# Patient Record
Sex: FEMALE | Race: WHITE | NOT HISPANIC OR LATINO | Employment: FULL TIME
[De-identification: names, ages, dates, MRNs, and addresses within clinical notes are randomized per-mention and may not be internally consistent; named-entity substitution may affect disease eponyms.]

---

## 2017-01-17 ENCOUNTER — RECORDS - HEALTHEAST (OUTPATIENT)
Dept: ADMINISTRATIVE | Facility: OTHER | Age: 42
End: 2017-01-17

## 2017-01-24 ENCOUNTER — COMMUNICATION - HEALTHEAST (OUTPATIENT)
Dept: TELEHEALTH | Facility: CLINIC | Age: 42
End: 2017-01-24

## 2017-01-24 ENCOUNTER — HOSPITAL ENCOUNTER (OUTPATIENT)
Dept: MAMMOGRAPHY | Facility: CLINIC | Age: 42
Discharge: HOME | End: 2017-01-24
Attending: FAMILY MEDICINE

## 2017-01-24 DIAGNOSIS — N64.89 BREAST ASYMMETRY IN FEMALE: ICD-10-CM

## 2019-04-29 ENCOUNTER — HOSPITAL ENCOUNTER (OUTPATIENT)
Dept: MAMMOGRAPHY | Facility: CLINIC | Age: 44
Discharge: HOME | End: 2019-04-29
Attending: OBSTETRICS & GYNECOLOGY

## 2019-04-29 DIAGNOSIS — Z12.31 VISIT FOR SCREENING MAMMOGRAM: ICD-10-CM

## 2020-02-24 RX ORDER — CETIRIZINE HYDROCHLORIDE 10 MG/1
10 TABLET ORAL DAILY
Status: SHIPPED | COMMUNITY
Start: 2020-02-24 | End: 2023-11-30

## 2020-02-24 ASSESSMENT — MIFFLIN-ST. JEOR
SCORE: 1843.37
SCORE: 1848.37

## 2020-02-26 ENCOUNTER — ANESTHESIA - HEALTHEAST (OUTPATIENT)
Dept: SURGERY | Facility: AMBULATORY SURGERY CENTER | Age: 45
End: 2020-02-26

## 2020-02-27 ENCOUNTER — SURGERY - HEALTHEAST (OUTPATIENT)
Dept: SURGERY | Facility: AMBULATORY SURGERY CENTER | Age: 45
End: 2020-02-27

## 2020-02-27 ENCOUNTER — HOSPITAL ENCOUNTER (OUTPATIENT)
Dept: SURGERY | Facility: AMBULATORY SURGERY CENTER | Age: 45
Discharge: HOME | End: 2020-02-27
Attending: OBSTETRICS & GYNECOLOGY | Admitting: OBSTETRICS & GYNECOLOGY

## 2020-02-27 DIAGNOSIS — Z30.2 ENCOUNTER FOR STERILIZATION: ICD-10-CM

## 2020-02-27 DIAGNOSIS — N83.201 OVARIAN CYST, RIGHT: ICD-10-CM

## 2020-02-27 DIAGNOSIS — N92.0 MENORRHAGIA: ICD-10-CM

## 2020-02-27 LAB
DIPSTICK EXPIRATION DATE - HISTORICAL: NORMAL
DIPSTICK LOT NUMBER - HISTORICAL: NORMAL
POC PREG URINE (HCG) HE - HISTORICAL: NEGATIVE
POC SPECIFIC GRAVITY, URINE - HISTORICAL: NORMAL
POCT KIT EXPIRATION DATE - HISTORICAL: NORMAL
POCT KIT LOT NUMBER HE - HISTORICAL: NORMAL
POCT NEGATIVE CONTROL HE - HISTORICAL: NORMAL
POCT POSITIVE CONTROL HE - HISTORICAL: NORMAL

## 2020-02-27 RX ORDER — ACETAMINOPHEN 500 MG
500 TABLET ORAL EVERY 6 HOURS PRN
Refills: 0 | Status: SHIPPED | COMMUNITY
Start: 2020-02-27 | End: 2023-11-30

## 2020-02-27 ASSESSMENT — MIFFLIN-ST. JEOR
SCORE: 1871.05
SCORE: 1866.05

## 2020-06-16 ENCOUNTER — RECORDS - HEALTHEAST (OUTPATIENT)
Dept: LAB | Facility: CLINIC | Age: 45
End: 2020-06-16

## 2020-06-17 LAB — COVID-19 ANTIBODY IGG: NEGATIVE

## 2020-11-24 ENCOUNTER — RECORDS - HEALTHEAST (OUTPATIENT)
Dept: ADMINISTRATIVE | Facility: OTHER | Age: 45
End: 2020-11-24

## 2020-11-24 ENCOUNTER — AMBULATORY - HEALTHEAST (OUTPATIENT)
Dept: SURGERY | Facility: HOSPITAL | Age: 45
End: 2020-11-24

## 2020-11-24 DIAGNOSIS — Z11.59 ENCOUNTER FOR SCREENING FOR OTHER VIRAL DISEASES: ICD-10-CM

## 2021-01-11 ENCOUNTER — SURGERY - HEALTHEAST (OUTPATIENT)
Dept: SURGERY | Facility: HOSPITAL | Age: 46
End: 2021-01-11

## 2021-04-08 ENCOUNTER — RECORDS - HEALTHEAST (OUTPATIENT)
Dept: ADMINISTRATIVE | Facility: OTHER | Age: 46
End: 2021-04-08

## 2021-04-08 ENCOUNTER — AMBULATORY - HEALTHEAST (OUTPATIENT)
Dept: SURGERY | Facility: HOSPITAL | Age: 46
End: 2021-04-08

## 2021-04-08 DIAGNOSIS — Z11.59 ENCOUNTER FOR SCREENING FOR OTHER VIRAL DISEASES: ICD-10-CM

## 2021-05-18 ENCOUNTER — RECORDS - HEALTHEAST (OUTPATIENT)
Dept: ADMINISTRATIVE | Facility: OTHER | Age: 46
End: 2021-05-18

## 2021-05-27 ENCOUNTER — RECORDS - HEALTHEAST (OUTPATIENT)
Dept: ADMINISTRATIVE | Facility: CLINIC | Age: 46
End: 2021-05-27

## 2021-06-03 ENCOUNTER — RECORDS - HEALTHEAST (OUTPATIENT)
Dept: ADMINISTRATIVE | Facility: CLINIC | Age: 46
End: 2021-06-03

## 2021-06-04 VITALS — HEIGHT: 69 IN | BODY MASS INDEX: 37.77 KG/M2 | WEIGHT: 255 LBS

## 2021-06-06 NOTE — ANESTHESIA PREPROCEDURE EVALUATION
Anesthesia Evaluation      Patient summary reviewed   No history of anesthetic complications     Airway   Mallampati: III  Neck ROM: full   Pulmonary     breath sounds clear to auscultation  (+) asthma    (-) shortness of breath, recent URI, sleep apnea, not a smoker                         Cardiovascular - negative ROS  Exercise tolerance: > or = 4 METS  (-) hypertension  Rhythm: regular  Rate: normal,         Neuro/Psych    (+) depression, anxiety/panic attacks,     Comments: migraines    Endo/Other    (+) obesity,   (-) no diabetes, hypothyroidism, not pregnant     Comments: Bilateral ovarian cysts   BMI 37 on phentermine, has not taken medication for the past 2 weeks    GI/Hepatic/Renal - negative ROS   (-) GERD     Other findings:   Labs 1/4/20  Hbg 12.9  Plt 227  Na 139  K 3.4  BUN/Cr 6/0.71  INR 1.01      Dental - normal exam                        Anesthesia Plan  Planned anesthetic: general endotracheal and total IV anesthesia  Acetaminophen 1 g, ketamine 50 mg on induction, ketorolac 15 mg if ok w/ surgeon  Scopolamine, dexamethasone 10 mg, ondansetron 4 mg   ASA 2   Induction: intravenous   Anesthetic plan and risks discussed with: patient  Anesthesia plan special considerations: antiemetics,   Post-op plan: routine recovery

## 2021-06-06 NOTE — H&P
Updated H&P    History and Physical Update    I have examined the patient and reviewed the history and physical that is present on this chart. The changes in the patient's history and physical condition are as follows: None      Dunia Brock

## 2021-06-06 NOTE — ANESTHESIA POSTPROCEDURE EVALUATION
Patient: Mikayla Purdy  Procedure(s):  HYSTEROSCOPY, DILATION AND CURETTAGE, MELISSA ENDOMETRIAL ABLATION, LAPAROSCOPIC bilateral OVARIAN CYSTECTOMY, LAPAROSCOPIC BILATERAL SALPINGECTOMY, extensive lysis of adhesions  LAPAROSCOPIC RIGHT OVARIAN CYSTECTOMY (Right)  LAPAROSCOPIC BILATERAL SALPINGECTOMY (Bilateral)  Anesthesia type: general    Patient location: PACU  Last vitals:   Vitals Value Taken Time   /74 2/27/2020  1:32 PM   Temp 36.3  C (97.3  F) 2/27/2020 12:39 PM   Pulse 67 2/27/2020  1:52 PM   Resp 16 2/27/2020 12:39 PM   SpO2 95 % 2/27/2020  1:52 PM   Vitals shown include unvalidated device data.  Post vital signs: stable  Level of consciousness: awake and responds to simple questions  Post-anesthesia pain: pain controlled  Post-anesthesia nausea and vomiting: no  Pulmonary: unassisted, return to baseline  Cardiovascular: stable and blood pressure at baseline  Hydration: adequate  Anesthetic events: no    QCDR Measures:  ASA# 11 - Georgette-op Cardiac Arrest: ASA11B - Patient did NOT experience unanticipated cardiac arrest  ASA# 12 - Georgette-op Mortality Rate: ASA12B - Patient did NOT die  ASA# 13 - PACU Re-Intubation Rate: ASA13B - Patient did NOT require a new airway mgmt  ASA# 10 - Composite Anes Safety: ASA10A - No serious adverse event    Additional Notes:

## 2021-06-06 NOTE — OP NOTE
Operative Note    Name:  Mikayla Purdy  Location: [facility]  Procedure Date:  2/27/2020  PCP:  Elli Guido PA-C      HYSTEROSCOPY, DILATION AND CURETTAGE, MELISSA ENDOMETRIAL ABLATION, LAPAROSCOPIC bilateral OVARIAN CYSTECTOMY, LAPAROSCOPIC BILATERAL SALPINGECTOMY, extensive lysis of adhesions, LAPAROSCOPIC RIGHT OVARIAN CYSTECTOMY (Right), LAPAROSCOPIC BILATERAL SALPINGECTOMY (Bilateral)    Pre-Procedure Diagnosis:  Menorrhagia [N92.0]  Ovarian cyst, right [N83.201]  Encounter for sterilization [Z30.2]     Post-Procedure Diagnosis:    Same as preop with adhesions      Surgeon(s):  Dunia Brock MD    No Physician Assistant or First Assist has been documented in procedure    Anesthesia Type:  General    Findings:  Large adhesions from omentum to anterior abdominal wall, normal uterus, tubes, ovaries - with small cysts (simple) on bilateral ovaries.      Complications:    None    Specimens:    ID Type Source Tests Collected by Time   A :  Tissue Fallopian Tube, Left SURGICAL PATHOLOGY EXAM Dunia Brock MD 2/27/2020 1125   B :  Tissue Fallopian Tube, Right SURGICAL PATHOLOGY EXAM Dunia Brock MD 2/27/2020 1126   C :  Tissue Ovary, Cyst, Left SURGICAL PATHOLOGY EXAM Dunia Brock MD 2/27/2020 1130   D : right  ovarian cyst wall Tissue Ovary, Cyst, Right SURGICAL PATHOLOGY EXAM Joanie Cade 2/27/2020 1131   E :  Tissue Endometrial Curettings SURGICAL PATHOLOGY EXAM Dunia Brock MD 2/27/2020 1140          Lines, Drains, Airways:  Urethral Catheter Non-latex 16 Fr. (Active)   Site Skin Assessment Clean 2/27/2020 11:19 AM   Care/Interventions Patent and draining 2/27/2020 11:19 AM   Securement Method Stabilization device 2/27/2020 11:19 AM       Implants:  * No implants in log *    Estimated Blood Loss: 30ml    PROCEDURE:  The patient was brought to the operating room and after induction of MAC anesthesia was prepped and draped in the dorsal lithotomy position. A time  out was called and the patient and the procedure were verified.  A bimanual exam was done, indicating anteverted normal uterus. No other abnormalities were noted.     A sterile speculum was placed. The anterior lip of the cervix were grasped with single tooth tenaculum. Uterus was then sounded to 10cm. The cervix was gently dilated using Cyndee dilators and the hysteroscope was introduced. Cavity of the uterus was noted to be large with fluffy endometrium. At this point endometrial curettings were obtained. In each case all quadrants were sampled, and the tissue was submitted for pathologic exam.     The val device was placed into the uterine cavity with a uterine length of 5.5cm and a width of 2.5cm.  The cavity assessment was normal.  The val device ablation was performed in 120sec.  The device was removed.  The hysteroscope was replaced in the uterus and a good burn with a stripe of active tissue at the fundus was noted.  The hysteroscope was removed.    At this point good hemostasis was noted with this portion of the procedure. Instruments were removed from vagina. No active bleeding was noted.     Gloves and gown were changed and attention was then turned to the abdomen.    A supra-umbilical incision was made 5 mm in length.  A Veress needle was introduced into the abdomen with the 2 pop technique and a saline drop test confirmed adequate placement and the abdomen.  An opening pressure of 4mmHg was noted.  The abdomen was insufflated to 15 mmHg.  The Veress needle was removed.  A 5 mm non-bladed trocar was placed into the incision under direct visualization with the camera and the port during time of placement.  Intra-abdominal placement of the trocar was confirmed.  Left and right lower quadrant 5 mm ports were placed under direct visualization.  The uterus and ovaries and tubes were evaluated and noted to be as above.      Adhesions were noted from the omentum to the anterior abdominal wall.  These  were taken down sharply and with cautery until fully dissected.   The right ovary was noted to be enlarged.  The cyst on the ovary was drained using irrigation suction with a needle tip.  The cyst wall was then incised using the laparoscopic scissors.  The cyst wall incision was noted to be approximately 3 cm.  The cyst wall was bluntly removed using prestige graspers.  The left ovary was noted to have a cyst and it was removed in the same fashion.  Bilateral fallopian tubes were then removed using the ligasure device using electrocautery and cut.      Small amounts of oozing from the cyst wall were controlled using 3 g of Kerry.  The abdomen was deinsufflated to 2 mmHg and the cyst was evaluated.  Excellent hemostasis was noted at this point.  30 mL of quarter percent Marcaine were instilled into the abdomen.  The abdomen was inspected and noted be within normal limits.  All trochars were removed and the abdomen was completely deinsufflated.  The incisions were closed using 4-0 Monocryl in subcuticular fashion.  Steri-Strips were placed over the incisions.  At this point the procedure was terminated.  The patient tolerated the procedure well.  Lap and needle counts correct ×2.  The patient was taken to the recovery room in stable condition.    Dunia Brock M.D.  Date: 2/27/2020  Time: 11:47 AM

## 2021-06-06 NOTE — ANESTHESIA CARE TRANSFER NOTE
Last vitals:   Vitals:    02/27/20 1201   BP: 136/74   Pulse:    Resp: 16   Temp:    SpO2:      Patient's level of consciousness is drowsy  Spontaneous respirations: yes  Maintains airway independently: yes  Dentition unchanged: yes  Oropharynx: oropharynx clear of all foreign objects    QCDR Measures:  ASA# 20 - Surgical Safety Checklist: WHO surgical safety checklist completed prior to induction    PQRS# 430 - Adult PONV Prevention: 4558F - Pt received => 2 anti-emetic agents (different classes) preop & intraop  ASA# 8 - Peds PONV Prevention: NA - Not pediatric patient, not GA or 2 or more risk factors NOT present  PQRS# 424 - Georgette-op Temp Management: 4559F - At least one body temp DOCUMENTED => 35.5C or 95.9F within required timeframe  PQRS# 426 - PACU Transfer Protocol: - Transfer of care checklist used  ASA# 14 - Acute Post-op Pain: ASA14B - Patient did NOT experience pain >= 7 out of 10

## 2021-06-07 ENCOUNTER — TRANSFERRED RECORDS (OUTPATIENT)
Dept: HEALTH INFORMATION MANAGEMENT | Facility: CLINIC | Age: 46
End: 2021-06-07

## 2021-06-09 ENCOUNTER — RECORDS - HEALTHEAST (OUTPATIENT)
Dept: ADMINISTRATIVE | Facility: OTHER | Age: 46
End: 2021-06-09

## 2021-06-10 ENCOUNTER — RECORDS - HEALTHEAST (OUTPATIENT)
Dept: ADMINISTRATIVE | Facility: OTHER | Age: 46
End: 2021-06-10

## 2021-06-10 ENCOUNTER — AMBULATORY - HEALTHEAST (OUTPATIENT)
Dept: LAB | Facility: CLINIC | Age: 46
End: 2021-06-10

## 2021-06-10 DIAGNOSIS — Z11.59 ENCOUNTER FOR SCREENING FOR OTHER VIRAL DISEASES: ICD-10-CM

## 2021-06-10 LAB
SARS-COV-2 PCR COMMENT: NORMAL
SARS-COV-2 RNA SPEC QL NAA+PROBE: NEGATIVE
SARS-COV-2 VIRUS SPECIMEN SOURCE: NORMAL

## 2021-06-10 ASSESSMENT — MIFFLIN-ST. JEOR: SCORE: 1836.91

## 2021-06-11 ENCOUNTER — COMMUNICATION - HEALTHEAST (OUTPATIENT)
Dept: SCHEDULING | Facility: CLINIC | Age: 46
End: 2021-06-11

## 2021-06-12 ENCOUNTER — ANESTHESIA - HEALTHEAST (OUTPATIENT)
Dept: SURGERY | Facility: HOSPITAL | Age: 46
End: 2021-06-12

## 2021-06-14 ENCOUNTER — RECORDS - HEALTHEAST (OUTPATIENT)
Dept: ADMINISTRATIVE | Facility: OTHER | Age: 46
End: 2021-06-14

## 2021-06-14 ENCOUNTER — SURGERY - HEALTHEAST (OUTPATIENT)
Dept: SURGERY | Facility: HOSPITAL | Age: 46
End: 2021-06-14

## 2021-06-14 ASSESSMENT — MIFFLIN-ST. JEOR
SCORE: 1850.63
SCORE: 1822.84

## 2021-06-16 PROBLEM — D50.9 ANEMIA, IRON DEFICIENCY: Status: ACTIVE | Noted: 2021-06-15

## 2021-06-16 PROBLEM — N92.0 MENORRHAGIA: Status: ACTIVE | Noted: 2021-06-15

## 2021-06-26 ENCOUNTER — HEALTH MAINTENANCE LETTER (OUTPATIENT)
Age: 46
End: 2021-06-26

## 2021-06-26 NOTE — ANESTHESIA POSTPROCEDURE EVALUATION
Patient: Mikayla Purdy  Procedure(s):  ROBOTIC TOTAL LAPAROSCOPIC HYSTERECTOMY, LEFT OOPHORECTOMY LYSIS OF ADHESIONS RIGHT OVARIAN CYSTECTOMY (Left)  CYSTOSCOPY  Anesthesia type: general    Patient location: PACU  Last vitals:   Vitals Value Taken Time   /77 06/14/21 1000   Temp 36.7  C (98.1  F) 06/14/21 0950   Pulse 62 06/14/21 1007   Resp 9 06/14/21 1007   SpO2 100 % 06/14/21 1007   Vitals shown include unvalidated device data.  Post vital signs: stable  Level of consciousness: alert and conversant  Post-anesthesia pain: pain controlled  Post-anesthesia nausea and vomiting: no  Pulmonary: supplemental oxygen at this time  Cardiovascular: stable and blood pressure at baseline  Hydration: adequate  Anesthetic events: no    QCDR Measures:  ASA# 11 - Georgette-op Cardiac Arrest: ASA11B - Patient did NOT experience unanticipated cardiac arrest  ASA# 12 - Georgette-op Mortality Rate: ASA12B - Patient did NOT die  ASA# 13 - PACU Re-Intubation Rate: ASA13B - Patient did NOT require a new airway mgmt  ASA# 10 - Composite Anes Safety: ASA10A - No serious adverse event    Additional Notes:

## 2021-06-26 NOTE — ANESTHESIA CARE TRANSFER NOTE
Last vitals:   Vitals:    06/14/21 0950   BP: 134/71   Pulse: 73   Resp: 12   Temp: 36.4  C (97.6  F)   SpO2: 100%     Patient's level of consciousness is awake  Spontaneous respirations: yes  Maintains airway independently: yes  Dentition unchanged: yes  Oropharynx: oropharynx clear of all foreign objects    QCDR Measures:  ASA# 20 - Surgical Safety Checklist: WHO surgical safety checklist completed prior to induction    PQRS# 430 - Adult PONV Prevention: 4558F - Pt received => 2 anti-emetic agents (different classes) preop & intraop  ASA# 8 - Peds PONV Prevention: NA - Not pediatric patient, not GA or 2 or more risk factors NOT present  PQRS# 424 - Georgette-op Temp Management: 4559F - At least one body temp DOCUMENTED => 35.5C or 95.9F within required timeframe  PQRS# 426 - PACU Transfer Protocol: - Transfer of care checklist used  ASA# 14 - Acute Post-op Pain: ASA14B - Patient did NOT experience pain >= 7 out of 10

## 2021-07-04 NOTE — ANESTHESIA PREPROCEDURE EVALUATION
"Anesthesia Preprocedure Evaluation by Kike Terrazas MD at 6/14/2021  6:29 AM     Author: Kike Terrazas MD Service: -- Author Type: Physician    Filed: 6/14/2021  6:37 AM Date of Service: 6/14/2021  6:29 AM Status: Signed    : Kike Terrazas MD (Physician)       Anesthesia Evaluation      Patient summary reviewed   No history of anesthetic complications     Airway   Mallampati: II  Neck ROM: full   Pulmonary - negative ROS and normal exam   (-) asthma                         Cardiovascular - negative ROS and normal exam  Exercise tolerance: > or = 4 METS   Neuro/Psych    (+) depression, anxiety/panic attacks,     Endo/Other    (+) obesity,      GI/Hepatic/Renal - negative ROS      Other findings: H/o exercise-induced asthma, \"resolved 2008\". Panic episodes.  Headaches.  Motion sickness.  Menometrorrhagia plus pelvic pain.  BMI 37.  H/o cancerous lesion removed left triceps.  Had TIVA 2/27/20 at [facility], record reviewed.  Hg 13.3, covid neg 6/10.      Dental                             Anesthesia Plan  Planned anesthetic: general endotracheal and total IV anesthesia  Mg++. Po tylenol, scop patch, ketamine, ketorolac at end.  ASA 3   Induction: intravenous   Anesthetic plan and risks discussed with: patient  Anesthesia plan special considerations: antiemetics,   Post-op plan: routine recovery               "

## 2021-07-06 VITALS
HEIGHT: 68 IN | WEIGHT: 248.1 LBS | WEIGHT: 251.2 LBS | WEIGHT: 255.1 LBS | BODY MASS INDEX: 37.1 KG/M2 | BODY MASS INDEX: 37.92 KG/M2 | BODY MASS INDEX: 38.79 KG/M2 | BODY MASS INDEX: 37.72 KG/M2 | BODY MASS INDEX: 36.64 KG/M2 | HEIGHT: 68 IN

## 2021-10-16 ENCOUNTER — HEALTH MAINTENANCE LETTER (OUTPATIENT)
Age: 46
End: 2021-10-16

## 2022-01-12 VITALS — BODY MASS INDEX: 38.66 KG/M2 | WEIGHT: 255.1 LBS | HEIGHT: 68 IN

## 2022-01-18 VITALS — WEIGHT: 255 LBS | BODY MASS INDEX: 37.77 KG/M2 | HEIGHT: 69 IN

## 2022-07-23 ENCOUNTER — HEALTH MAINTENANCE LETTER (OUTPATIENT)
Age: 47
End: 2022-07-23

## 2022-10-01 ENCOUNTER — HEALTH MAINTENANCE LETTER (OUTPATIENT)
Age: 47
End: 2022-10-01

## 2023-02-04 ENCOUNTER — HEALTH MAINTENANCE LETTER (OUTPATIENT)
Age: 48
End: 2023-02-04

## 2023-02-17 ENCOUNTER — TRANSFERRED RECORDS (OUTPATIENT)
Dept: HEALTH INFORMATION MANAGEMENT | Facility: CLINIC | Age: 48
End: 2023-02-17

## 2023-08-06 ENCOUNTER — HEALTH MAINTENANCE LETTER (OUTPATIENT)
Age: 48
End: 2023-08-06

## 2023-08-08 ENCOUNTER — HOSPITAL ENCOUNTER (OUTPATIENT)
Dept: MAMMOGRAPHY | Facility: CLINIC | Age: 48
Discharge: HOME | End: 2023-08-08
Attending: PHYSICIAN ASSISTANT | Admitting: PHYSICIAN ASSISTANT
Payer: COMMERCIAL

## 2023-08-08 DIAGNOSIS — Z12.31 VISIT FOR SCREENING MAMMOGRAM: ICD-10-CM

## 2023-08-08 PROCEDURE — 77067 SCR MAMMO BI INCL CAD: CPT

## 2023-11-30 ENCOUNTER — MYC MEDICAL ADVICE (OUTPATIENT)
Dept: FAMILY MEDICINE | Facility: CLINIC | Age: 48
End: 2023-11-30
Payer: COMMERCIAL

## 2023-11-30 DIAGNOSIS — B02.9 HERPES ZOSTER WITHOUT COMPLICATION: Primary | ICD-10-CM

## 2023-11-30 RX ORDER — VALACYCLOVIR HYDROCHLORIDE 1 G/1
1000 TABLET, FILM COATED ORAL 3 TIMES DAILY
Qty: 21 TABLET | Refills: 0 | Status: SHIPPED | OUTPATIENT
Start: 2023-11-30 | End: 2023-12-07

## 2023-12-05 ENCOUNTER — OFFICE VISIT (OUTPATIENT)
Dept: FAMILY MEDICINE | Facility: CLINIC | Age: 48
End: 2023-12-05
Payer: COMMERCIAL

## 2023-12-05 ENCOUNTER — LAB (OUTPATIENT)
Dept: FAMILY MEDICINE | Facility: CLINIC | Age: 48
End: 2023-12-05

## 2023-12-05 VITALS
RESPIRATION RATE: 15 BRPM | HEART RATE: 75 BPM | WEIGHT: 261.5 LBS | SYSTOLIC BLOOD PRESSURE: 124 MMHG | HEIGHT: 68 IN | TEMPERATURE: 97.4 F | BODY MASS INDEX: 39.63 KG/M2 | DIASTOLIC BLOOD PRESSURE: 80 MMHG | OXYGEN SATURATION: 99 %

## 2023-12-05 DIAGNOSIS — L65.9 HAIR LOSS: ICD-10-CM

## 2023-12-05 DIAGNOSIS — E66.01 MORBID OBESITY (H): ICD-10-CM

## 2023-12-05 DIAGNOSIS — Z11.59 NEED FOR HEPATITIS C SCREENING TEST: ICD-10-CM

## 2023-12-05 DIAGNOSIS — L65.9 HAIR LOSS: Primary | ICD-10-CM

## 2023-12-05 DIAGNOSIS — R53.83 OTHER FATIGUE: ICD-10-CM

## 2023-12-05 DIAGNOSIS — Z12.11 SCREEN FOR COLON CANCER: ICD-10-CM

## 2023-12-05 PROBLEM — M06.9 RHEUMATOID ARTHRITIS (H): Status: ACTIVE | Noted: 2023-12-05

## 2023-12-05 PROBLEM — E55.9 VITAMIN D DEFICIENCY: Status: ACTIVE | Noted: 2023-12-05

## 2023-12-05 PROBLEM — F32.A DEPRESSIVE DISORDER: Status: ACTIVE | Noted: 2023-12-05

## 2023-12-05 PROBLEM — M06.9 RHEUMATOID ARTHRITIS (H): Status: RESOLVED | Noted: 2023-12-05 | Resolved: 2023-12-05

## 2023-12-05 LAB
ALBUMIN SERPL BCG-MCNC: 4.3 G/DL (ref 3.5–5.2)
ALP SERPL-CCNC: 75 U/L (ref 40–150)
ALT SERPL W P-5'-P-CCNC: 56 U/L (ref 0–50)
ANION GAP SERPL CALCULATED.3IONS-SCNC: 12 MMOL/L (ref 7–15)
AST SERPL W P-5'-P-CCNC: 57 U/L (ref 0–45)
BASOPHILS # BLD AUTO: 0 10E3/UL (ref 0–0.2)
BASOPHILS NFR BLD AUTO: 0 %
BILIRUB SERPL-MCNC: 0.5 MG/DL
BUN SERPL-MCNC: 8.3 MG/DL (ref 6–20)
CALCIUM SERPL-MCNC: 9.3 MG/DL (ref 8.6–10)
CHLORIDE SERPL-SCNC: 106 MMOL/L (ref 98–107)
CHOLEST SERPL-MCNC: 154 MG/DL
CREAT SERPL-MCNC: 0.72 MG/DL (ref 0.51–0.95)
DEPRECATED HCO3 PLAS-SCNC: 25 MMOL/L (ref 22–29)
EGFRCR SERPLBLD CKD-EPI 2021: >90 ML/MIN/1.73M2
EOSINOPHIL # BLD AUTO: 0.2 10E3/UL (ref 0–0.7)
EOSINOPHIL NFR BLD AUTO: 2 %
ERYTHROCYTE [DISTWIDTH] IN BLOOD BY AUTOMATED COUNT: 12.1 % (ref 10–15)
ERYTHROCYTE [SEDIMENTATION RATE] IN BLOOD BY WESTERGREN METHOD: 23 MM/HR (ref 0–20)
FASTING STATUS PATIENT QL REPORTED: NO
FERRITIN SERPL-MCNC: 158 NG/ML (ref 6–175)
GLUCOSE SERPL-MCNC: 96 MG/DL (ref 70–99)
HCT VFR BLD AUTO: 40.9 % (ref 35–47)
HDLC SERPL-MCNC: 50 MG/DL
HGB BLD-MCNC: 13.5 G/DL (ref 11.7–15.7)
IMM GRANULOCYTES # BLD: 0 10E3/UL
IMM GRANULOCYTES NFR BLD: 0 %
LDLC SERPL CALC-MCNC: 59 MG/DL
LYMPHOCYTES # BLD AUTO: 2.5 10E3/UL (ref 0.8–5.3)
LYMPHOCYTES NFR BLD AUTO: 34 %
MCH RBC QN AUTO: 29.6 PG (ref 26.5–33)
MCHC RBC AUTO-ENTMCNC: 33 G/DL (ref 31.5–36.5)
MCV RBC AUTO: 90 FL (ref 78–100)
MONOCYTES # BLD AUTO: 0.6 10E3/UL (ref 0–1.3)
MONOCYTES NFR BLD AUTO: 8 %
NEUTROPHILS # BLD AUTO: 4.2 10E3/UL (ref 1.6–8.3)
NEUTROPHILS NFR BLD AUTO: 56 %
NONHDLC SERPL-MCNC: 104 MG/DL
PLATELET # BLD AUTO: 228 10E3/UL (ref 150–450)
POTASSIUM SERPL-SCNC: 3.8 MMOL/L (ref 3.4–5.3)
PROT SERPL-MCNC: 7.8 G/DL (ref 6.4–8.3)
RBC # BLD AUTO: 4.56 10E6/UL (ref 3.8–5.2)
SHBG SERPL-SCNC: 69 NMOL/L (ref 30–135)
SODIUM SERPL-SCNC: 143 MMOL/L (ref 135–145)
TRIGL SERPL-MCNC: 223 MG/DL
TSH SERPL DL<=0.005 MIU/L-ACNC: 2.65 UIU/ML (ref 0.3–4.2)
VIT D+METAB SERPL-MCNC: 27 NG/ML (ref 20–50)
WBC # BLD AUTO: 7.4 10E3/UL (ref 4–11)

## 2023-12-05 PROCEDURE — 82306 VITAMIN D 25 HYDROXY: CPT | Performed by: PHYSICIAN ASSISTANT

## 2023-12-05 PROCEDURE — 84443 ASSAY THYROID STIM HORMONE: CPT | Performed by: PHYSICIAN ASSISTANT

## 2023-12-05 PROCEDURE — 85652 RBC SED RATE AUTOMATED: CPT | Performed by: PHYSICIAN ASSISTANT

## 2023-12-05 PROCEDURE — 82627 DEHYDROEPIANDROSTERONE: CPT | Performed by: PHYSICIAN ASSISTANT

## 2023-12-05 PROCEDURE — 84270 ASSAY OF SEX HORMONE GLOBUL: CPT | Performed by: PHYSICIAN ASSISTANT

## 2023-12-05 PROCEDURE — 85025 COMPLETE CBC W/AUTO DIFF WBC: CPT | Performed by: PHYSICIAN ASSISTANT

## 2023-12-05 PROCEDURE — 36415 COLL VENOUS BLD VENIPUNCTURE: CPT | Performed by: PHYSICIAN ASSISTANT

## 2023-12-05 PROCEDURE — 80053 COMPREHEN METABOLIC PANEL: CPT | Performed by: PHYSICIAN ASSISTANT

## 2023-12-05 PROCEDURE — 82728 ASSAY OF FERRITIN: CPT | Performed by: PHYSICIAN ASSISTANT

## 2023-12-05 PROCEDURE — 84403 ASSAY OF TOTAL TESTOSTERONE: CPT | Performed by: PHYSICIAN ASSISTANT

## 2023-12-05 PROCEDURE — 80061 LIPID PANEL: CPT | Performed by: PHYSICIAN ASSISTANT

## 2023-12-05 PROCEDURE — 99204 OFFICE O/P NEW MOD 45 MIN: CPT | Performed by: PHYSICIAN ASSISTANT

## 2023-12-05 RX ORDER — CITALOPRAM HYDROBROMIDE 10 MG/1
10 TABLET ORAL DAILY
COMMUNITY
Start: 2018-05-20 | End: 2024-06-23

## 2023-12-05 ASSESSMENT — PAIN SCALES - GENERAL: PAINLEVEL: NO PAIN (0)

## 2023-12-05 NOTE — PROGRESS NOTES
"  Assessment & Plan     (L65.9) Hair loss  (primary encounter diagnosis)  Comment:   Plan: COLOGUARD([employer] SCI[employer]ES), Vitamin D         Deficiency, Erythrocyte sedimentation rate         auto, Ferritin, Testosterone Free and Total,         DHEA sulfate, TSH with free T4 reflex, CBC with        Platelets & Differential, Comprehensive         metabolic panel        Lab work today to rule out causes of hair loss and fatigue, consdier further workup based on results today    (Z12.11) Screen for colon cancer  Comment:   Plan: Cologuard sent, advised of positive and negatives      (R53.83) Other fatigue  Comment:   Plan: See above, her other chronic medical conditions are stable at this time    (E66.01) Morbid obesity (H)  Comment:   Plan: Lipid panel reflex to direct LDL Non-fasting,         liraglutide - Weight Management (SAXENDA) 18         MG/3ML pen        PA needed for liraglutide, consider alternative or MTM evaluation for coverage in the future.  She is open to trying phentermine again if liraglutide is not covered             BMI:   Estimated body mass index is 39.76 kg/m  as calculated from the following:    Height as of this encounter: 1.727 m (5' 8\").    Weight as of this encounter: 118.6 kg (261 lb 8 oz).           Mayito Small PA-C  [facility]    Bret Degroot is a 48 year old, presenting for the following health issues:  Follow Up (Ovary follow up, fatigue, hair loss)      12/5/2023    11:21 AM   Additional Questions   Roomed by Philomena RAMENTA         12/5/2023    11:21 AM   Patient Reported Additional Medications   Patient reports taking the following new medications Reconcile       History of Present Illness       Reason for visit:  Blood draw-hormone levels?  Symptom onset:  More than a month  Symptoms include:  Hair loss, mood change, ovary pain  Symptom intensity:  Moderate  Symptom progression:  Worsening  Had these symptoms before:  Yes  Has tried/received treatment " "for these symptoms:  Yes  Previous treatment was successful:  Yes  Prior treatment description:  Progesterone pills  What makes it worse:  No  What makes it better:  No    She eats 4 or more servings of fruits and vegetables daily.She consumes 1 sweetened beverage(s) daily.She exercises with enough effort to increase her heart rate 20 to 29 minutes per day.  She exercises with enough effort to increase her heart rate 3 or less days per week.   She is taking medications regularly.     Pt seeing gyn for previous hysterectomy and ovarian cysts, 1 remaining ovary now noting pain, unable to get into Gyn until January, pain well controlled with OTC meds, waxing and waning.  No current OCP being used, no vaginal bleeding ro discharge.    She is also noting hair loss, weight gain, body aches, wondering about hormone levels, thyroid, Vit D anemia - hx of all of the above    Previously on phentermine for weight loss, wegivy not covered by insurance, she would like alternative GLP1 if covered.              Review of Systems         Objective    /80 (BP Location: Right arm, Patient Position: Sitting, Cuff Size: Adult Large)   Pulse 75   Temp 97.4  F (36.3  C) (Temporal)   Resp 15   Ht 1.727 m (5' 8\")   Wt 118.6 kg (261 lb 8 oz)   LMP  (LMP Unknown)   SpO2 99%   BMI 39.76 kg/m    Body mass index is 39.76 kg/m .  Physical Exam   GENERAL: healthy, alert and no distress  EYES: Eyes grossly normal to inspection, EOM intact and conjunctivae normal  RESP: breathing comfortably on room air  PSYCH: mentation appears normal, affect normal/bright                        "

## 2023-12-06 ENCOUNTER — TELEPHONE (OUTPATIENT)
Dept: FAMILY MEDICINE | Facility: CLINIC | Age: 48
End: 2023-12-06
Payer: COMMERCIAL

## 2023-12-06 DIAGNOSIS — E66.01 MORBID OBESITY (H): Primary | ICD-10-CM

## 2023-12-06 LAB — DHEA-S SERPL-MCNC: <15 UG/DL (ref 35–430)

## 2023-12-06 NOTE — TELEPHONE ENCOUNTER
Mayito Small --    Informed patient of denial.  PRIOR AUTHORIZATION DENIED     Medication: LIRAGLUTIDE -WEIGHT MANAGEMENT 18 MG/3ML SC SOPN     Patient requesting script for phentermine.   [facility]armacy: [facility].     Jennifer Trevino, SHAYN RN  [facility]

## 2023-12-06 NOTE — TELEPHONE ENCOUNTER
PRIOR AUTHORIZATION DENIED    Medication: LIRAGLUTIDE -WEIGHT MANAGEMENT 18 MG/3ML SC SOPN  Insurance Company: [employer] [place] - Phone [phone] Fax [phone]  Denial Date: 12/6/2023  Denial Reason(s):     Appeal Information:     Patient Notified: No

## 2023-12-07 LAB
TESTOST FREE SERPL-MCNC: 0.1 NG/DL
TESTOST SERPL-MCNC: 9 NG/DL (ref 8–60)

## 2023-12-07 RX ORDER — PHENTERMINE HYDROCHLORIDE 37.5 MG/1
37.5 TABLET ORAL
Qty: 30 TABLET | Refills: 5 | Status: SHIPPED | OUTPATIENT
Start: 2023-12-07

## 2023-12-08 DIAGNOSIS — R53.83 OTHER FATIGUE: Primary | ICD-10-CM

## 2023-12-18 DIAGNOSIS — R53.83 OTHER FATIGUE: Primary | ICD-10-CM

## 2023-12-30 LAB — NONINV COLON CA DNA+OCC BLD SCRN STL QL: NEGATIVE

## 2024-01-04 DIAGNOSIS — R79.89 ELEVATED LFTS: Primary | ICD-10-CM

## 2024-01-23 DIAGNOSIS — N92.4 EXCESSIVE BLEEDING IN PREMENOPAUSAL PERIOD: Primary | ICD-10-CM

## 2024-01-31 ENCOUNTER — LAB REQUISITION (OUTPATIENT)
Dept: LAB | Facility: CLINIC | Age: 49
End: 2024-01-31

## 2024-01-31 DIAGNOSIS — R74.01 ELEVATION OF LEVELS OF LIVER TRANSAMINASE LEVELS: ICD-10-CM

## 2024-01-31 PROCEDURE — 80053 COMPREHEN METABOLIC PANEL: CPT | Performed by: OBSTETRICS & GYNECOLOGY

## 2024-02-01 LAB
ALBUMIN SERPL BCG-MCNC: 4.2 G/DL (ref 3.5–5.2)
ALP SERPL-CCNC: 72 U/L (ref 40–150)
ALT SERPL W P-5'-P-CCNC: 47 U/L (ref 0–50)
ANION GAP SERPL CALCULATED.3IONS-SCNC: 8 MMOL/L (ref 7–15)
AST SERPL W P-5'-P-CCNC: 50 U/L (ref 0–45)
BILIRUB SERPL-MCNC: 0.5 MG/DL
BUN SERPL-MCNC: 10.6 MG/DL (ref 6–20)
CALCIUM SERPL-MCNC: 9.3 MG/DL (ref 8.6–10)
CHLORIDE SERPL-SCNC: 104 MMOL/L (ref 98–107)
CREAT SERPL-MCNC: 0.76 MG/DL (ref 0.51–0.95)
DEPRECATED HCO3 PLAS-SCNC: 27 MMOL/L (ref 22–29)
EGFRCR SERPLBLD CKD-EPI 2021: >90 ML/MIN/1.73M2
GLUCOSE SERPL-MCNC: 86 MG/DL (ref 70–99)
POTASSIUM SERPL-SCNC: 4.5 MMOL/L (ref 3.4–5.3)
PROT SERPL-MCNC: 7.5 G/DL (ref 6.4–8.3)
SODIUM SERPL-SCNC: 139 MMOL/L (ref 135–145)

## 2024-02-15 DIAGNOSIS — R07.81 RIB PAIN: Primary | ICD-10-CM

## 2024-02-15 RX ORDER — GABAPENTIN 300 MG/1
300 CAPSULE ORAL 3 TIMES DAILY
Qty: 30 CAPSULE | Refills: 0 | Status: SHIPPED | OUTPATIENT
Start: 2024-02-15

## 2024-06-23 ENCOUNTER — MYC REFILL (OUTPATIENT)
Dept: FAMILY MEDICINE | Facility: CLINIC | Age: 49
End: 2024-06-23
Payer: COMMERCIAL

## 2024-06-23 DIAGNOSIS — F41.1 GAD (GENERALIZED ANXIETY DISORDER): Primary | ICD-10-CM

## 2024-06-26 RX ORDER — CITALOPRAM HYDROBROMIDE 10 MG/1
10 TABLET ORAL DAILY
Qty: 90 TABLET | Refills: 3 | Status: SHIPPED | OUTPATIENT
Start: 2024-06-26

## 2024-09-28 ENCOUNTER — HEALTH MAINTENANCE LETTER (OUTPATIENT)
Age: 49
End: 2024-09-28

## 2024-11-18 DIAGNOSIS — B02.9 HERPES ZOSTER WITHOUT COMPLICATION: Primary | ICD-10-CM

## 2024-11-18 RX ORDER — VALACYCLOVIR HYDROCHLORIDE 1 G/1
1000 TABLET, FILM COATED ORAL 3 TIMES DAILY
Qty: 21 TABLET | Refills: 0 | Status: SHIPPED | OUTPATIENT
Start: 2024-11-18

## 2024-11-18 RX ORDER — PREDNISONE 20 MG/1
40 TABLET ORAL DAILY
Qty: 10 TABLET | Refills: 0 | Status: SHIPPED | OUTPATIENT
Start: 2024-11-18 | End: 2024-11-23

## 2024-12-09 DIAGNOSIS — F41.1 GAD (GENERALIZED ANXIETY DISORDER): ICD-10-CM

## 2024-12-09 RX ORDER — CITALOPRAM HYDROBROMIDE 10 MG/1
20 TABLET ORAL DAILY
Qty: 180 TABLET | Refills: 1 | Status: SHIPPED | OUTPATIENT
Start: 2024-12-09

## 2025-01-28 ENCOUNTER — TRANSFERRED RECORDS (OUTPATIENT)
Dept: HEALTH INFORMATION MANAGEMENT | Facility: CLINIC | Age: 50
End: 2025-01-28
Payer: COMMERCIAL

## 2025-06-29 ASSESSMENT — SLEEP AND FATIGUE QUESTIONNAIRES
HOW LIKELY ARE YOU TO NOD OFF OR FALL ASLEEP WHILE WATCHING TV: SLIGHT CHANCE OF DOZING
HOW LIKELY ARE YOU TO NOD OFF OR FALL ASLEEP IN A CAR, WHILE STOPPED FOR A FEW MINUTES IN TRAFFIC: WOULD NEVER DOZE
HOW LIKELY ARE YOU TO NOD OFF OR FALL ASLEEP WHILE SITTING INACTIVE IN A PUBLIC PLACE: WOULD NEVER DOZE
HOW LIKELY ARE YOU TO NOD OFF OR FALL ASLEEP WHILE SITTING QUIETLY AFTER LUNCH WITHOUT ALCOHOL: WOULD NEVER DOZE
HOW LIKELY ARE YOU TO NOD OFF OR FALL ASLEEP WHILE SITTING AND READING: SLIGHT CHANCE OF DOZING
HOW LIKELY ARE YOU TO NOD OFF OR FALL ASLEEP WHILE LYING DOWN TO REST IN THE AFTERNOON WHEN CIRCUMSTANCES PERMIT: MODERATE CHANCE OF DOZING
HOW LIKELY ARE YOU TO NOD OFF OR FALL ASLEEP WHILE SITTING AND TALKING TO SOMEONE: WOULD NEVER DOZE
HOW LIKELY ARE YOU TO NOD OFF OR FALL ASLEEP WHEN YOU ARE A PASSENGER IN A CAR FOR AN HOUR WITHOUT A BREAK: WOULD NEVER DOZE

## 2025-07-02 ASSESSMENT — SLEEP AND FATIGUE QUESTIONNAIRES
HOW LIKELY ARE YOU TO NOD OFF OR FALL ASLEEP IN A CAR, WHILE STOPPED FOR A FEW MINUTES IN TRAFFIC: WOULD NEVER DOZE
HOW LIKELY ARE YOU TO NOD OFF OR FALL ASLEEP WHILE WATCHING TV: SLIGHT CHANCE OF DOZING
HOW LIKELY ARE YOU TO NOD OFF OR FALL ASLEEP WHILE LYING DOWN TO REST IN THE AFTERNOON WHEN CIRCUMSTANCES PERMIT: MODERATE CHANCE OF DOZING
HOW LIKELY ARE YOU TO NOD OFF OR FALL ASLEEP WHILE SITTING AND TALKING TO SOMEONE: WOULD NEVER DOZE
HOW LIKELY ARE YOU TO NOD OFF OR FALL ASLEEP WHILE SITTING QUIETLY AFTER LUNCH WITHOUT ALCOHOL: WOULD NEVER DOZE
HOW LIKELY ARE YOU TO NOD OFF OR FALL ASLEEP WHEN YOU ARE A PASSENGER IN A CAR FOR AN HOUR WITHOUT A BREAK: WOULD NEVER DOZE
HOW LIKELY ARE YOU TO NOD OFF OR FALL ASLEEP WHILE SITTING INACTIVE IN A PUBLIC PLACE: WOULD NEVER DOZE
HOW LIKELY ARE YOU TO NOD OFF OR FALL ASLEEP WHILE SITTING AND READING: SLIGHT CHANCE OF DOZING

## 2025-07-03 ENCOUNTER — OFFICE VISIT (OUTPATIENT)
Dept: SURGERY | Facility: CLINIC | Age: 50
End: 2025-07-03
Payer: COMMERCIAL

## 2025-07-03 ENCOUNTER — TELEPHONE (OUTPATIENT)
Dept: SURGERY | Facility: CLINIC | Age: 50
End: 2025-07-03

## 2025-07-03 ENCOUNTER — LAB (OUTPATIENT)
Dept: LAB | Facility: CLINIC | Age: 50
End: 2025-07-03
Payer: COMMERCIAL

## 2025-07-03 ENCOUNTER — RESULTS FOLLOW-UP (OUTPATIENT)
Dept: SURGERY | Facility: CLINIC | Age: 50
End: 2025-07-03

## 2025-07-03 VITALS
BODY MASS INDEX: 38.8 KG/M2 | DIASTOLIC BLOOD PRESSURE: 78 MMHG | HEIGHT: 68 IN | SYSTOLIC BLOOD PRESSURE: 114 MMHG | WEIGHT: 256 LBS

## 2025-07-03 DIAGNOSIS — E66.01 SEVERE OBESITY (H): ICD-10-CM

## 2025-07-03 DIAGNOSIS — M54.50 LOW BACK PAIN, UNSPECIFIED BACK PAIN LATERALITY, UNSPECIFIED CHRONICITY, UNSPECIFIED WHETHER SCIATICA PRESENT: ICD-10-CM

## 2025-07-03 DIAGNOSIS — K75.81 METABOLIC DYSFUNCTION-ASSOCIATED STEATOHEPATITIS (MASH): ICD-10-CM

## 2025-07-03 DIAGNOSIS — E88.819 INSULIN RESISTANCE: ICD-10-CM

## 2025-07-03 DIAGNOSIS — K76.0 HEPATIC STEATOSIS: ICD-10-CM

## 2025-07-03 DIAGNOSIS — E66.01 SEVERE OBESITY (H): Primary | ICD-10-CM

## 2025-07-03 LAB
FERRITIN SERPL-MCNC: 105 NG/ML (ref 6–175)
PTH-INTACT SERPL-MCNC: 42 PG/ML (ref 15–65)
TSH SERPL DL<=0.005 MIU/L-ACNC: 2.47 UIU/ML (ref 0.3–4.2)
VIT B12 SERPL-MCNC: 405 PG/ML (ref 232–1245)

## 2025-07-03 RX ORDER — DESOGESTREL AND ETHINYL ESTRADIOL 0.15-0.03
KIT ORAL DAILY
COMMUNITY
End: 2025-07-03

## 2025-07-03 RX ORDER — SEMAGLUTIDE 0.25 MG/.5ML
0.25 INJECTION, SOLUTION SUBCUTANEOUS WEEKLY
Qty: 2 ML | Refills: 0 | Status: SHIPPED | OUTPATIENT
Start: 2025-07-03 | End: 2025-07-31

## 2025-07-03 RX ORDER — PROGESTERONE 100 MG/1
100 CAPSULE ORAL DAILY
COMMUNITY

## 2025-07-03 NOTE — PATIENT INSTRUCTIONS
"[facility]    Remember to: fl3ur for savings card    [email or web address]   [place] pharmacy-call us if you want to go this route    $419 Ozempic 4mg/3ml pen. If you chose this option you will need pen needles which I will send to your local pharmacy. I will also provide \"clicking\" instructions:-)    -Eat 3 meals a day (not 2, not 5) Chew your food well/SLOW down  -Eat your protein first  -Be a water drinker/Minize liquid calories (no regular pop, no juice) skim or 1% milk OK  -Sleep 7-8 hours each night. Address sleep if problematic  -Stress management is important. Address if problematic  -Move-8000 steps daily Muscle: maintain your muscle mass (strength training 2X/wk)  -Wheat, not white (bread, pasta, crackers, ernestine, bagels, tortillas, rice)  -Limit restaurant, cafeteria, take out, drive through to 2 times per week or less  -Minimize caffeine, alcohol, and night-time snacking  -Consider keeping a food diary (i.e. My Fitness Pal, Lose It, or other food tracker)  -Follow up with the dietitian      **Some lean proteins: chicken, turkey, tuna, salmon, crab, fish, shrimp, scallops, lobster, lean cuts of beef and pork, luncheon meats, veggie burgers, beans (black, lima, garbanzo, dinero, kidney, refried), chile, cottage cheese, string cheese, other cheese, eggs, tofu, peanut butter, nuts, vegan crumbles, greek yogurt    Nausea and constipation are the most common side effects with the GLP-1/GIP medications.     Drinking water throughout the day, preventing and or treating constipation, and eating 3 nutrient dense meals containing protein is important while on GLP-1 RA/GIP medications. It can mitigate these side effects.     For Prevention and Treatment of Constipation    From least aggressive to most aggressive:    Move: wallking-the more we move, the more our bowels move  Water: Drink water-64oz+ day  Go when you need to go. Don't wait. The longer you wait, the harder it gets.  Fiber: Fruit, raw " veggies, nuts, whole grains,   Stool Softeners: if constipation is mild and for maintenance  Gentle laxatives: Miralax, senokot, dulcolax , Smooth move tea as needed    More aggressive (and typically won't get to this point)  Milk of Magnesia  Mag Citrate (what you drink before a colonoscopy)  Suppositories  Enema      After 3 injections: Send a [employer] message or call [phone] to let Crystal or Gayle know that you are ready to go to the next dose or if you would like to stay at the current dose another month.

## 2025-07-03 NOTE — PROGRESS NOTES
"    New Medical Weight Management Consult    PATIENT:  Mikayla Purdy  MRN:         [id number]  :         1975  LUIS:         7/3/2025    Dear Mayito Small PA-C,    I had the pleasure of seeing your patient, Mikayla Purdy. Full intake/assessment was done to determine barriers to weight loss success and develop a treatment plan. Mikayla Purdy is a 49 year old female interested in treatment of medical problems associated with excess weight. She has a height of 5' 8\", a weight of 256 lbs 0 oz, and the calculated Body mass index is 38.92 kg/m .    ASSESSMENT/PLAN:  Insulin Resistance and related Metabolic Associated Steatohepatitis (MASH)  Stress  Intermittent activity  Labs  Registered Dietitian for Medical Nutrition Therapy  Wegovy. Start 0.25mg weekly and ramp up. Will try insurance first, savings card, then Insulin Outlet in that order.   We discussed the importance of hydration, staying ahead of potential constipation, and consuming 3 protein containing, nutrient dense meals while taking GLP-1 RA medications.  After 3 injections: Send a Geosho message or call [phone] to let Crystal or Gayle know that you are ready to go to the next dose or if you would like to stay at the current dose another month.   Discussed prevention and treatment of constipation    We discussed Bariatric Basics including:  -eating 3 meals daily  -eating protein first  -eating slowly, chewing food well  -avoiding/limiting calorie containing beverages  -choosing wheat, not white with breads, crackers, pastas, ernestine, bagels, tortillas, rice  -limiting restaurant or cafeteria eating to twice a week or less    We discussed the importance of restorative sleep and stress management in maintaining a healthy weight.    We reviewed medications associated with weight gain.    We discussed insulin resistance and glycemic index as it relates to appetite and weight control.     We discussed the National Weight Control Registry healthy weight " "maintenance strategies and ways to optimize metabolism.  We discussed the importance of physical activity including cardiovascular and strength training in maintaining a healthier weight and explored viable options.    We discussed medications available for weight loss including Phentermine, Phendimetrazine, Topamax, Qsymia, Diethylproprion, Orlistat, Contrave (Bupropion/Naltrexone), Saxenda (Liraglutide), Wegovy (Semaglutide), Zepbound (Tirzepatide) and Vyvanse (for Binge Eating Disorder). We discussed the risks and benefits of each. We discussed indications, contraindications, potential side effects, and estimated costs of each. Literature was provided. Mikayla understands that not using a weight loss medication is an option.       She has the following co-morbidities:        7/2/2025    11:10 AM   --   I have the following health issues associated with obesity None of the above   I have the following symptoms associated with obesity Depression    Lower Extremity Swelling    Back Pain    Fatigue           7/2/2025    11:10 AM   Referring Provider   Please name the provider who referred you to Medical Weight Management  If you do not know, please answer \"I Don't Know\" NA           7/2/2025    11:10 AM   Weight History   How concerned are you about your weight? Very Concerned   I became overweight As a Child   The following factors have contributed to my weight gain Change in Schedule    Eating Wrong Types of Food    Eating Too Much    Lack of Exercise    Stress   I have tried the following methods to lose weight Watching Portions or Calories    Exercise    Slim Fast or Other Liquid Diets    Medications   My lowest weight since age 19 was 195   My highest weight since age 19 was 275   The most weight I have ever lost was (lbs) 40   I have the following family history of obesity/being overweight My father is overweight    One or more of my siblings are overweight   How has your weight changed over the last year? Gained "   How many pounds? 20           7/2/2025    11:10 AM   Diet Recall Review with Patient   If you do eat breakfast, what types of food do you eat? Overnight oats, protein shake or yogurt with fruit and granola   If you do eat lunch, what types of food do you typically eat? Leftovers, cheese/crackers   If you do eat supper, what types of food do you typically eat? Dinner including meat, veg, carb   If you do snack, what types of food do you typically eat? fruit, protein cookie, nuts, licorice   How many glasses of juice do you drink in a typical day? 0   How many of glasses of milk do you drink in a typical day? 0   How many 8oz glasses of sugar containing drinks such as Tim-Aid/sweet tea do you drink in a day? 0   How many cans/bottles of sugar pop/soda/tea/sports drinks do you drink in a day? 0   How many cans/bottles of diet pop/soda/tea or sports drink do you drink in a day? 2   How often do you have a drink of alcohol? Monthly or Less   If you do drink, how many drinks might you have in a day? 1 or 2           7/2/2025    11:10 AM   Eating Habits   Generally, my meals include foods like these bread, pasta, rice, potatoes, corn, crackers, sweet dessert, pop, or juice Everyday   Generally, my meals include foods like these fried meats, brats, burgers, french fries, pizza, cheese, chips, or ice cream A Few Times a Week   Eat fast food (like [employer], [employer], Taco Bell) Once a Week   Eat at a buffet or sit-down restaurant Less Than Weekly   Eat most of my meals in front of the TV or computer A Few Times a Week   Often skip meals, eat at random times, have no regular eating times A Few Times a Week   Rarely sit down for a meal but snack or graze throughout A Few Times a Week   Eat extra snacks between meals A Few Times a Week   Eat most of my food at the end of the day Less Than Weekly   Eat in the middle of the night or wake up at night to eat Never   Eat extra snacks to prevent or correct low blood sugar Never    Eat to prevent acid reflux or stomach pain Never   Worry about not having enough food to eat Never   I eat when I am depressed Less Than Weekly   I eat when I am stressed A Few Times a Week   I eat when I am bored Less Than Weekly   I eat when I am anxious Less Than Weekly   I eat when I am happy or as a reward Once a Week   I feel hungry all the time even if I just have eaten Never   Feeling full is important to me Almost Everyday   I finish all the food on my plate even if I am already full Less Than Weekly   I can't resist eating delicious food or walk past the good food/smell Almost Everyday   I eat/snack without noticing that I am eating Never   I eat when I am preparing the meal Never   I eat more than usual when I see others eating Less Than Weekly   I have trouble not eating sweets, ice cream, cookies, or chips if they are around the house Once a Week   I think about food all day Never   What foods, if any, do you crave? Sweets/Candy/Chocolate   Please list any other foods you crave? Ice cream, all of the foods above, just depends on what looks good.           7/2/2025    11:10 AM   Amount of Food   I feel out of control when eating Monthly   I eat a large amount of food, like a loaf of bread, a box of cookies, a pint/quart of ice cream, all at once Monthly   I eat a large amount of food even when I am not hungry Never   I eat rapidly Never   I eat alone because I feel embarrassed and do not want others to see how much I have eaten Never   I eat until I am uncomfortably full Monthly   I feel bad, disgusted, or guilty after I overeat Monthly           7/2/2025    11:10 AM   Activity/Exercise History   How much of a typical 12 hour day do you spend sitting? Half the Day   How much of a typical 12 hour day do you spend lying down? Less Than Half the Day   How much of a typical day do you spend walking/standing? Half the Day   How many hours (not including work) do you spend on the TV/Video  Games/Computer/Tablet/Phone? 2-3 Hours   How many times a week are you active for the purpose of exercise? 2-3 Times a Week   What keeps you from being more active? Lack of Time    Too tired   How many total minutes do you spend doing some activity for the purpose of exercising when you exercise? 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 75%   How many hours do you spend commuting to work daily? 25   What is your marital status? /In a Relationship   If in a relationship, is your significant other overweight? Yes   If you have children, are they overweight? Yes   Who do you live with? , Step-daughter & 3 sons   Who does the food shopping? Me           7/2/2025    11:10 AM   Mental Health History Reviewed With Patient   Have you ever been physically or sexually abused? No   How often in the past 2 weeks have you felt little interest or pleasure in doing things? Not at all   Over the past 2 weeks how often have you felt down, depressed, or hopeless? Not at all           7/2/2025    11:10 AM   Sleep History Reviewed With Patient   How many hours do you sleep at night? 8       MEDICATIONS:   Current Outpatient Medications   Medication Sig Dispense Refill    citalopram (CELEXA) 10 MG tablet Take 2 tablets (20 mg) by mouth daily. 180 tablet 1    Fexofenadine HCl (ALLEGRA ALLERGY PO)       progesterone  "(PROMETRIUM) 100 MG capsule Take 100 mg by mouth daily.      semaglutide-weight management (WEGOVY) 0.25 MG/0.5ML pen Inject 0.5 mLs (0.25 mg) subcutaneously once a week for 28 days. Please notify patient when available. Thank you 2 mL 0       ALLERGIES:   Allergies   Allergen Reactions    Adhesive Tape-Silicones [Adhesive Tape] Swelling     Steri strips, cause redness and swelling       PHYSICAL EXAM:  /78 (BP Location: Right arm, Patient Position: Sitting, Cuff Size: Adult Regular)   Ht 1.727 m (5' 8\")   Wt 116.1 kg (256 lb)   LMP  (LMP Unknown)   BMI 38.92 kg/m      Waist circumference: 45 cm (H: 52.5)    Wt Readings from Last 4 Encounters:   07/03/25 116.1 kg (256 lb)   12/05/23 118.6 kg (261 lb 8 oz)   06/14/21 115.7 kg (255 lb 1.6 oz)   06/14/21 115.7 kg (255 lb 1.6 oz)     Pleasant and in no distress  Neck 15.5\" Mallampati 2+  Heart regular  Lungs clear  Abdominal circumference 45\"  A & O x 3  Skin: Tan, hirsutism  Neuro: no tremor  Psych: Euthymic  Gait normal        FOLLOW-UP:   scheduled    TIME: 60 min spent on evaluation, management, counseling, education, & motivational interviewing     Sincerely,    Mally Aldridge MD, MPH, FAAFP  Diplomate, American Board of Obesity Medicine            "

## 2025-07-03 NOTE — LETTER
"7/3/2025      Mikayla Purdy  [id number] [address] [place] [id number]      Dear Colleague,    Thank you for referring your patient, Mikayla Purdy, to the [facility] [place]. Please see a copy of my visit note below.        New Medical Weight Management Consult    PATIENT:  Mikayla Purdy  MRN:         [id number]  :         1975  LUIS:         7/3/2025    Dear Mayito Small PA-C,    I had the pleasure of seeing your patient, Mikayla Purdy. Full intake/assessment was done to determine barriers to weight loss success and develop a treatment plan. Mikayla Purdy is a 49 year old female interested in treatment of medical problems associated with excess weight. She has a height of 5' 8\", a weight of 256 lbs 0 oz, and the calculated Body mass index is 38.92 kg/m .    ASSESSMENT/PLAN:  Insulin Resistance and related Metabolic Associated Steatohepatitis (MASH)  Stress  Intermittent activity  Labs  Registered Dietitian for Medical Nutrition Therapy  Wegovy. Start 0.25mg weekly and ramp up. Will try insurance first, savings card, then Insulin Outlet in that order.   We discussed the importance of hydration, staying ahead of potential constipation, and consuming 3 protein containing, nutrient dense meals while taking GLP-1 RA medications.  After 3 injections: Send a Sarbari message or call [phone] to let Crystal or Gayel know that you are ready to go to the next dose or if you would like to stay at the current dose another month.   Discussed prevention and treatment of constipation    We discussed Bariatric Basics including:  -eating 3 meals daily  -eating protein first  -eating slowly, chewing food well  -avoiding/limiting calorie containing beverages  -choosing wheat, not white with breads, crackers, pastas, ernestine, bagels, tortillas, rice  -limiting restaurant or cafeteria eating to twice a week or less    We discussed the importance of restorative sleep and stress " "management in maintaining a healthy weight.    We reviewed medications associated with weight gain.    We discussed insulin resistance and glycemic index as it relates to appetite and weight control.     We discussed the National Weight Control Registry healthy weight maintenance strategies and ways to optimize metabolism.  We discussed the importance of physical activity including cardiovascular and strength training in maintaining a healthier weight and explored viable options.    We discussed medications available for weight loss including Phentermine, Phendimetrazine, Topamax, Qsymia, Diethylproprion, Orlistat, Contrave (Bupropion/Naltrexone), Saxenda (Liraglutide), Wegovy (Semaglutide), Zepbound (Tirzepatide) and Vyvanse (for Binge Eating Disorder). We discussed the risks and benefits of each. We discussed indications, contraindications, potential side effects, and estimated costs of each. Literature was provided. Mikayla understands that not using a weight loss medication is an option.       She has the following co-morbidities:        7/2/2025    11:10 AM   --   I have the following health issues associated with obesity None of the above   I have the following symptoms associated with obesity Depression    Lower Extremity Swelling    Back Pain    Fatigue           7/2/2025    11:10 AM   Referring Provider   Please name the provider who referred you to Medical Weight Management  If you do not know, please answer \"I Don't Know\" NA           7/2/2025    11:10 AM   Weight History   How concerned are you about your weight? Very Concerned   I became overweight As a Child   The following factors have contributed to my weight gain Change in Schedule    Eating Wrong Types of Food    Eating Too Much    Lack of Exercise    Stress   I have tried the following methods to lose weight Watching Portions or Calories    Exercise    Slim Fast or Other Liquid Diets    Medications   My lowest weight since age 19 was 195   My " highest weight since age 19 was 275   The most weight I have ever lost was (lbs) 40   I have the following family history of obesity/being overweight My father is overweight    One or more of my siblings are overweight   How has your weight changed over the last year? Gained   How many pounds? 20           7/2/2025    11:10 AM   Diet Recall Review with Patient   If you do eat breakfast, what types of food do you eat? Overnight oats, protein shake or yogurt with fruit and granola   If you do eat lunch, what types of food do you typically eat? Leftovers, cheese/crackers   If you do eat supper, what types of food do you typically eat? Dinner including meat, veg, carb   If you do snack, what types of food do you typically eat? fruit, protein cookie, nuts, licorice   How many glasses of juice do you drink in a typical day? 0   How many of glasses of milk do you drink in a typical day? 0   How many 8oz glasses of sugar containing drinks such as Tim-Aid/sweet tea do you drink in a day? 0   How many cans/bottles of sugar pop/soda/tea/sports drinks do you drink in a day? 0   How many cans/bottles of diet pop/soda/tea or sports drink do you drink in a day? 2   How often do you have a drink of alcohol? Monthly or Less   If you do drink, how many drinks might you have in a day? 1 or 2           7/2/2025    11:10 AM   Eating Habits   Generally, my meals include foods like these bread, pasta, rice, potatoes, corn, crackers, sweet dessert, pop, or juice Everyday   Generally, my meals include foods like these fried meats, brats, burgers, french fries, pizza, cheese, chips, or ice cream A Few Times a Week   Eat fast food (like [employer], [employer], Taco Bell) Once a Week   Eat at a buffet or sit-down restaurant Less Than Weekly   Eat most of my meals in front of the TV or computer A Few Times a Week   Often skip meals, eat at random times, have no regular eating times A Few Times a Week   Rarely sit down for a meal but snack or  graze throughout A Few Times a Week   Eat extra snacks between meals A Few Times a Week   Eat most of my food at the end of the day Less Than Weekly   Eat in the middle of the night or wake up at night to eat Never   Eat extra snacks to prevent or correct low blood sugar Never   Eat to prevent acid reflux or stomach pain Never   Worry about not having enough food to eat Never   I eat when I am depressed Less Than Weekly   I eat when I am stressed A Few Times a Week   I eat when I am bored Less Than Weekly   I eat when I am anxious Less Than Weekly   I eat when I am happy or as a reward Once a Week   I feel hungry all the time even if I just have eaten Never   Feeling full is important to me Almost Everyday   I finish all the food on my plate even if I am already full Less Than Weekly   I can't resist eating delicious food or walk past the good food/smell Almost Everyday   I eat/snack without noticing that I am eating Never   I eat when I am preparing the meal Never   I eat more than usual when I see others eating Less Than Weekly   I have trouble not eating sweets, ice cream, cookies, or chips if they are around the house Once a Week   I think about food all day Never   What foods, if any, do you crave? Sweets/Candy/Chocolate   Please list any other foods you crave? Ice cream, all of the foods above, just depends on what looks good.           7/2/2025    11:10 AM   Amount of Food   I feel out of control when eating Monthly   I eat a large amount of food, like a loaf of bread, a box of cookies, a pint/quart of ice cream, all at once Monthly   I eat a large amount of food even when I am not hungry Never   I eat rapidly Never   I eat alone because I feel embarrassed and do not want others to see how much I have eaten Never   I eat until I am uncomfortably full Monthly   I feel bad, disgusted, or guilty after I overeat Monthly           7/2/2025    11:10 AM   Activity/Exercise History   How much of a typical 12 hour day  do you spend sitting? Half the Day   How much of a typical 12 hour day do you spend lying down? Less Than Half the Day   How much of a typical day do you spend walking/standing? Half the Day   How many hours (not including work) do you spend on the TV/Video Games/Computer/Tablet/Phone? 2-3 Hours   How many times a week are you active for the purpose of exercise? 2-3 Times a Week   What keeps you from being more active? Lack of Time    Too tired   How many total minutes do you spend doing some activity for the purpose of exercising when you exercise? 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 75%   How many hours do you spend commuting to work daily? 25   What is your marital status? /In a Relationship   If in a relationship, is your significant other overweight? Yes   If you have children, are they overweight? Yes   Who do you live with? , Step-daughter & 3 sons   Who does the food shopping? Me           7/2/2025    11:10 AM   Mental Health History Reviewed With Patient   Have you ever been physically or sexually abused? No   How often in the past 2 weeks have you felt little interest or pleasure in doing things? Not at all   Over the past 2 weeks how often have you felt down, depressed, or hopeless? Not at all           7/2/2025    11:10 AM   Sleep History Reviewed With Patient   How many  "hours do you sleep at night? 8       MEDICATIONS:   Current Outpatient Medications   Medication Sig Dispense Refill     citalopram (CELEXA) 10 MG tablet Take 2 tablets (20 mg) by mouth daily. 180 tablet 1     Fexofenadine HCl (ALLEGRA ALLERGY PO)        progesterone (PROMETRIUM) 100 MG capsule Take 100 mg by mouth daily.       semaglutide-weight management (WEGOVY) 0.25 MG/0.5ML pen Inject 0.5 mLs (0.25 mg) subcutaneously once a week for 28 days. Please notify patient when available. Thank you 2 mL 0       ALLERGIES:   Allergies   Allergen Reactions     Adhesive Tape-Silicones [Adhesive Tape] Swelling     Steri strips, cause redness and swelling       PHYSICAL EXAM:  /78 (BP Location: Right arm, Patient Position: Sitting, Cuff Size: Adult Regular)   Ht 1.727 m (5' 8\")   Wt 116.1 kg (256 lb)   LMP  (LMP Unknown)   BMI 38.92 kg/m      Waist circumference: 45 cm (H: 52.5)    Wt Readings from Last 4 Encounters:   07/03/25 116.1 kg (256 lb)   12/05/23 118.6 kg (261 lb 8 oz)   06/14/21 115.7 kg (255 lb 1.6 oz)   06/14/21 115.7 kg (255 lb 1.6 oz)     Pleasant and in no distress  Neck 15.5\" Mallampati 2+  Heart regular  Lungs clear  Abdominal circumference 45\"  A & O x 3  Skin: Tan, hirsutism  Neuro: no tremor  Psych: Euthymic  Gait normal        FOLLOW-UP:   scheduled    TIME: 60 min spent on evaluation, management, counseling, education, & motivational interviewing     Sincerely,    Mally Aldridge MD, MPH, FAAFP  Diplomate, American Board of Obesity Medicine              I, Mikayla Purdy, give verbal consent for a resident to be present in today's visit.     Again, thank you for allowing me to participate in the care of your patient.        Sincerely,        Mally Aldridge MD    Electronically signed"

## 2025-07-03 NOTE — TELEPHONE ENCOUNTER
Prior Authorization Retail Medication Request    Medication/Dose: Wegovy 0.25mg/0.5ml Auto-injectors.   New/renewal/insurance change PA/secondary ins. PA: New  Previously Tried and Failed:    Watching Portions or Calories   Exercise   Slim Fast or Other Liquid Diets   Medications    Insurance     Key: [id number]    Pharmacy Information (if different than what is on RX)  Name:  [id number] [place], [place]   Phone:  [phone]   Fax:  [phone]     Clinic Information  Preferred routing pool for dept communication: Bariatric Surgery Support Pool [place]

## 2025-07-07 ENCOUNTER — OFFICE VISIT (OUTPATIENT)
Dept: FAMILY MEDICINE | Facility: CLINIC | Age: 50
End: 2025-07-07
Payer: COMMERCIAL

## 2025-07-07 ENCOUNTER — ANCILLARY PROCEDURE (OUTPATIENT)
Dept: GENERAL RADIOLOGY | Facility: CLINIC | Age: 50
End: 2025-07-07
Attending: PHYSICIAN ASSISTANT
Payer: COMMERCIAL

## 2025-07-07 VITALS
OXYGEN SATURATION: 97 % | TEMPERATURE: 97.5 F | WEIGHT: 262 LBS | HEIGHT: 69 IN | SYSTOLIC BLOOD PRESSURE: 102 MMHG | RESPIRATION RATE: 14 BRPM | HEART RATE: 82 BPM | BODY MASS INDEX: 38.8 KG/M2 | DIASTOLIC BLOOD PRESSURE: 68 MMHG

## 2025-07-07 DIAGNOSIS — R07.89 LEFT-SIDED CHEST WALL PAIN: ICD-10-CM

## 2025-07-07 DIAGNOSIS — Z12.31 ENCOUNTER FOR SCREENING MAMMOGRAM FOR BREAST CANCER: ICD-10-CM

## 2025-07-07 DIAGNOSIS — R07.89 LEFT-SIDED CHEST WALL PAIN: Primary | ICD-10-CM

## 2025-07-07 PROCEDURE — G2211 COMPLEX E/M VISIT ADD ON: HCPCS | Performed by: PHYSICIAN ASSISTANT

## 2025-07-07 PROCEDURE — 99214 OFFICE O/P EST MOD 30 MIN: CPT | Performed by: PHYSICIAN ASSISTANT

## 2025-07-07 PROCEDURE — 1125F AMNT PAIN NOTED PAIN PRSNT: CPT | Performed by: PHYSICIAN ASSISTANT

## 2025-07-07 PROCEDURE — 71046 X-RAY EXAM CHEST 2 VIEWS: CPT | Mod: TC | Performed by: RADIOLOGY

## 2025-07-07 PROCEDURE — 3078F DIAST BP <80 MM HG: CPT | Performed by: PHYSICIAN ASSISTANT

## 2025-07-07 PROCEDURE — 3074F SYST BP LT 130 MM HG: CPT | Performed by: PHYSICIAN ASSISTANT

## 2025-07-07 ASSESSMENT — PAIN SCALES - GENERAL: PAINLEVEL_OUTOF10: MILD PAIN (3)

## 2025-07-07 NOTE — PROGRESS NOTES
"  Assessment & Plan     (R07.89) Left-sided chest wall pain  (primary encounter diagnosis)  Comment:   Plan: XR Chest 2 Views, CT Chest w/o Contrast            (Z12.31) Encounter for screening mammogram for breast cancer  Comment:   Plan: MA Diagnostic Bilateral w/ Ronald          CXR, mammogram and chest CT to radha out causes for chest wall pain.  Low suspicion for PE based on Wells score of 0. No cardiac hx, consider workup for cards source if all above workup is negative and sx are not resolving - EKG not performed based on no exertional sx and physical TTP present.      BMI  Estimated body mass index is 38.69 kg/m  as calculated from the following:    Height as of this encounter: 1.753 m (5' 9\").    Weight as of this encounter: 118.8 kg (262 lb).             Bret Degroot is a 49 year old, presenting for the following health issues:  Office Visit (Pt states that she has chest tightness and a dull pain for a few weeks.)        7/7/2025     2:01 PM   Additional Questions   Roomed by Kassandra KIM CMA   Accompanied by Self         7/7/2025     2:01 PM   Patient Reported Additional Medications   Patient reports taking the following new medications NO     History of Present Illness       Reason for visit:  Shortness of breath occasionally, pain in ribs, soreness on left side  Symptom onset:  3-4 weeks ago  Symptoms include:  Same as above  Symptom intensity:  Moderate  Symptom progression:  Worsening  Had these symptoms before:  No  What makes it worse:  No  What makes it better:  No   She is taking medications regularly.        Previous hx of L side rib fracture, noting 1-2 months of L side chest wall pain, TTP and with certain movements.  Sharp shooting pain without radiation at rest, no exertional angina, no cough or hemoptysis, no tachycardia or tachypnea reported by Mikayla.    No relief methods used to date.    No hx of asthma or smoking hx.    Due for mammogram          Objective    /68 (BP Location: " "Right arm, Patient Position: Sitting, Cuff Size: Adult Large)   Pulse 82   Resp 14   Ht 1.753 m (5' 9\")   Wt 118.8 kg (262 lb)   LMP  (LMP Unknown)   SpO2 97%   BMI 38.69 kg/m    Body mass index is 38.69 kg/m .  Physical Exam   GENERAL: alert and no distress  RESP: lungs clear to auscultation - no rales, rhonchi or wheezes  CV: regular rate and rhythm, normal S1 S2, no S3 or S4, no murmur, click or rub, no peripheral edema; L clavicular chest wall TTP, no mass, no breast mass  MS: no gross musculoskeletal defects noted, no edema            Signed Electronically by: Mayito Small PA-C    "

## 2025-07-08 NOTE — TELEPHONE ENCOUNTER
[facility]ail Pharmacy Prior Authorization Team   Phone: [phone]    PA Initiation    Medication: WEGOVY 0.25 MG/0.5ML SC SOAJ  Insurance Company: [employer] [place] - Phone [phone] Fax [phone]  Pharmacy Filling the Rx: [id number] [place], [place] - [address]  Filling Pharmacy Phone: [phone]  Filling Pharmacy Fax:    Start Date: 7/8/2025    UNABLE TO FIND PATIENT ELIGIBILITY ON CM - COMPLETED COVERAGE DETERMINATION FORM ON [facility] AND SUBMITTED REQUEST

## 2025-07-08 NOTE — TELEPHONE ENCOUNTER
PRIOR AUTHORIZATION DENIED    Medication: WEGOVY 0.25 MG/0.5ML SC SOAJ  Insurance Company: [employer] [place] - Phone [phone] Fax [phone]  Denial Date: 7/8/2025  Denial Reason(s): WEIGHT LOSS DRUGS ARE EXCLUDED FROM COVERAGE  Appeal Information: N/A  Patient Notified: NO

## 2025-07-14 ENCOUNTER — VIRTUAL VISIT (OUTPATIENT)
Dept: SURGERY | Facility: CLINIC | Age: 50
End: 2025-07-14
Payer: COMMERCIAL

## 2025-07-14 DIAGNOSIS — Z71.3 NUTRITIONAL COUNSELING: ICD-10-CM

## 2025-07-14 DIAGNOSIS — E66.9 OBESITY (BMI 30-39.9): Primary | ICD-10-CM

## 2025-07-14 DIAGNOSIS — K75.81 METABOLIC DYSFUNCTION-ASSOCIATED STEATOHEPATITIS (MASH): ICD-10-CM

## 2025-07-14 PROCEDURE — 97802 MEDICAL NUTRITION INDIV IN: CPT | Mod: 95 | Performed by: DIETITIAN, REGISTERED

## 2025-07-14 NOTE — PROGRESS NOTES
Mikayla Purdy is a 49 year old who is being evaluated via a billable video visit.      How would you like to obtain your AVS? MyChart  If the video visit is dropped, the invitation should be resent by: Text to cell phone: [phone]  Will anyone else be joining your video visit? No          Medical Weight Loss Initial Diet Evaluation  Assessment:  This patient was referred by Dr. Aldridge for MNT as treatment for Obesity which is impacting depression, lower extremity swelling, back pain, fatigue    Mikayla is presenting today for a new weight management nutrition consultation. Pt has had an initial appointment with Dr. Aldridge.    Weight loss medication: Wegovy - denied by insurance.     Anthropometrics:    Initial weight: 262 lbs  BMI: 38.69  Ideal body weight: 66.2 kg (145 lb 15.1 oz)  Adjusted ideal body weight: 87.3 kg (192 lb 5.9 oz)  Estimated RMR ([place]-St Jeor equation):  1,883 kcals x 1.2 (sedentary) = 2,259 kcals (for weight maintenance)    Recommended Protein Intake: 85 - 105 grams of protein/day    Medical History:  Patient Active Problem List   Diagnosis    Menorrhagia    Anemia, iron deficiency    Morbid obesity (H)    Depressive disorder    Vitamin D deficiency    Low back pain    Hepatic steatosis    Insulin resistance    Metabolic dysfunction-associated steatohepatitis (MASH)    Severe obesity (H)   Diabetes: no, A1C of 5.3% on 12/29/2023    Nutrition History:   Food allergies/intolerances/cultural or religous food customs: No.     Weight loss history: watching portions or calories, exercise, liquid diets, medications (phentermine). Patient stated that she went to a women's clinic and did the compounds Semaglutide injections - was on t for a year - side effects when she didn't eat enough and not eating well (loose stools, nausea) - lost about 40 lbs. Patient stated that she made changes to her nutrition during that time and has more of a protein focus with her intake.     Vitamins/Mineral  Supplementation: B12    Dietary Recall:  Wakes up at 5:30-8:30 AM  Breakfast (8-9 AM): Oats Overnight OR protein shake with a banana OR yogurt with fruit and granola   Snack  Lunch (12:30-1 PM): Leftovers OR cheese/crackers   Snack  Dinner (4:30-7:30 PM): meat, vegetables and CHO OR crockpot chicken teriyaki with pineapple rice and green beans  Typical Snacks: protein bar, trail mix, fruit, licorice    Eating out: 2-3x/week - [employer], [employer]    Beverages:   Water -  ounces  Mountain Dew Zero/Coke Zero - 1-2 cans/day  Shaken Espresso - sometimes    Exercise:   Treadmill - walking 20-30 minutes - 3-4x/day  Mobility  at her chiropractor - stretching, strength training - 2x/week    Nutrition Diagnosis (PES statement):     Obesity related to excessive energy intake as evidence by BMI of 38.69     Nutrition Intervention  Food and/or Nutrient Delivery   Placed emphasis on importance of developing a healthy meal routine, aiming for 3 meals a day and no snacks.  Nutrition Education   Discussed with patient how to build a meal: the importance of including a lean/low fat protein at each meal, include a source of vegetables at a minimum of lunch and dinner and limiting carbohydrate intake   Educated on sources of lean protein, portion sizes, the amount of grams found in each source. Recommend patient to aim for 20-30g protein at each meal.  Discussed the importance of adequate hydration, with emphasis on drinking 64oz of water or zero calorie beverages per day.  Nutrition Counseling   Encouraged importance of developing routine exercise for health benefits and weight loss.    Goals:   Aim to have protein and fibrous items first with meals  Focus on complex CHO with meals  Stay consistent with three meals per day  Stay consistent with movement during the week    Handouts provided:  Intro to [facility]  Protein Sources  Fiber Sources  Zip Fizz    Assessment/Plan:    Pt will follow up in 4 month(s) with bariatrician and  PRN with dietitian.     Video-Visit Details    Type of service:  Video Visit    Video Start Time (time video started): 3:00 PM    Video End Time (time video stopped): 3:26 PM    Originating Location (pt. Location): Home      Distant Location (provider location):  On-site    Mode of Communication:  Video Conference via [facility]    Physician has received verbal consent for a Video Visit from the patient? Yes      Karine Childers RD

## 2025-07-14 NOTE — LETTER
7/14/2025      Mikayla Purdy  [id number] [address] [place] [id number]      Dear Colleague,    Thank you for referring your patient, Mikayla Purdy, to the [facility] [place]. Please see a copy of my visit note below.    Mikayla Purdy is a 49 year old who is being evaluated via a billable video visit.      How would you like to obtain your AVS? MyChart  If the video visit is dropped, the invitation should be resent by: Text to cell phone: [phone]  Will anyone else be joining your video visit? No          Medical Weight Loss Initial Diet Evaluation  Assessment:  This patient was referred by Dr. Aldridge for MNT as treatment for Obesity which is impacting depression, lower extremity swelling, back pain, fatigue    Mikayla is presenting today for a new weight management nutrition consultation. Pt has had an initial appointment with Dr. Aldridge.    Weight loss medication: Wegovy - denied by insurance.     Anthropometrics:    Initial weight: 262 lbs  BMI: 38.69  Ideal body weight: 66.2 kg (145 lb 15.1 oz)  Adjusted ideal body weight: 87.3 kg (192 lb 5.9 oz)  Estimated RMR ([place]-St Jeor equation):  1,883 kcals x 1.2 (sedentary) = 2,259 kcals (for weight maintenance)    Recommended Protein Intake: 85 - 105 grams of protein/day    Medical History:  Patient Active Problem List   Diagnosis     Menorrhagia     Anemia, iron deficiency     Morbid obesity (H)     Depressive disorder     Vitamin D deficiency     Low back pain     Hepatic steatosis     Insulin resistance     Metabolic dysfunction-associated steatohepatitis (MASH)     Severe obesity (H)   Diabetes: no, A1C of 5.3% on 12/29/2023    Nutrition History:   Food allergies/intolerances/cultural or religous food customs: No.     Weight loss history: watching portions or calories, exercise, liquid diets, medications (phentermine). Patient stated that she went to a women's clinic and did the compounds Semaglutide injections - was on t  for a year - side effects when she didn't eat enough and not eating well (loose stools, nausea) - lost about 40 lbs. Patient stated that she made changes to her nutrition during that time and has more of a protein focus with her intake.     Vitamins/Mineral Supplementation: B12    Dietary Recall:  Wakes up at 5:30-8:30 AM  Breakfast (8-9 AM): Oats Overnight OR protein shake with a banana OR yogurt with fruit and granola   Snack  Lunch (12:30-1 PM): Leftovers OR cheese/crackers   Snack  Dinner (4:30-7:30 PM): meat, vegetables and CHO OR crockpot chicken teriyaki with pineapple rice and green beans  Typical Snacks: protein bar, trail mix, fruit, licorice    Eating out: 2-3x/week - [employer], [employer]    Beverages:   Water -  ounces  Mountain Dew Zero/Coke Zero - 1-2 cans/day  Shaken Espresso - sometimes    Exercise:   Treadmill - walking 20-30 minutes - 3-4x/day  Mobility  at her chiropractor - stretching, strength training - 2x/week    Nutrition Diagnosis (PES statement):     Obesity related to excessive energy intake as evidence by BMI of 38.69     Nutrition Intervention  Food and/or Nutrient Delivery   Placed emphasis on importance of developing a healthy meal routine, aiming for 3 meals a day and no snacks.  Nutrition Education   Discussed with patient how to build a meal: the importance of including a lean/low fat protein at each meal, include a source of vegetables at a minimum of lunch and dinner and limiting carbohydrate intake   Educated on sources of lean protein, portion sizes, the amount of grams found in each source. Recommend patient to aim for 20-30g protein at each meal.  Discussed the importance of adequate hydration, with emphasis on drinking 64oz of water or zero calorie beverages per day.  Nutrition Counseling   Encouraged importance of developing routine exercise for health benefits and weight loss.    Goals:   Aim to have protein and fibrous items first with meals  Focus on complex  CHO with meals  Stay consistent with three meals per day  Stay consistent with movement during the week    Handouts provided:  Intro to [facility]  Protein Sources  Fiber Sources  Zip Fizz    Assessment/Plan:    Pt will follow up in 4 month(s) with bariatrician and PRN with dietitian.     Video-Visit Details    Type of service:  Video Visit    Video Start Time (time video started): 3:00 PM    Video End Time (time video stopped): 3:26 PM    Originating Location (pt. Location): Home      Distant Location (provider location):  On-site    Mode of Communication:  Video Conference via [facility]    Physician has received verbal consent for a Video Visit from the patient? Yes      Karine Childers RD       Again, thank you for allowing me to participate in the care of your patient.        Sincerely,        Karine Childers RD    Electronically signed

## 2025-07-14 NOTE — PATIENT INSTRUCTIONS
[email or web address]        Eat Better ? Move More ? Live Well    Eat 3 nutrient-rich meals each day     Don't skip meals--it will cause you to overeat later in the day!     Eating fiber (vegetables/fruits/whole grains) and protein with meals helps you stay full longer     Choose foods with less than 10 grams of sugar and 5 grams of fat per serving to prevent excess calories and weight re-gain   Eat around the same times each day to develop a routine eating schedule    Avoid snacking unless physically hungry.   Planned snacks: 1-2 times per day and no more than 150 calories    Eat protein first    Protein helps with healing, maintaining adequate muscle mass, reducing hunger and optimizing nutritional status    Aim for  grams of protein per day   Fill up on Fiber    Fiber comes from plants--fruits, veggies, whole grains, nuts/seeds and beans    Fiber is low in calories, high in phytonutrients and helps you stay full longer    Aim for 25-35 grams per day by eating fiber with meals and snacks  Eat [id number]    Take 20-30 minutes to eat each meal by taking small bites, chewing foods to applesauce consistency or 20-30 times before you swallow    Eating foods too fast can delay satiety/fullness signals and increase overeating   Slow down your eating by using toddler utensils, putting your fork/spoon down between bites and not watching TV or emailing during meals!   Keep a Journal          Writing down what you eat, how you feel and when you are active helps you identify new changes to work on from week to week          Look for ways to cut 100 calories from your current diet 2-3 times per day  Drink 64 ounces of 0-Calorie drinks between meals    Water    Zero calorie Propel  or Vitamin Water      SoBe Lif[place]ater  Zero Calories    Crystal Light , Sugar-Free Tim-Aid , and other sugar-free lemonade or flavored elizabeth    Keep Caffeine to less than 300mg per day ie: 3-6oz cups coffee     Work up to 45-60 minutes of  physical activity most days of the week    Helps with losing weight and prevent regaining those extra pounds!     Do a combo of cardio (walking/water exercises) and strength training (lifting weights/Vinyasa yoga)    Avoid Mindless Eating    Be present when you eat--take note of the smell, taste and quality of your food    Make a list of alternative activities you could do to prevent eating out of boredom/stress  Go for a walk, call a friend, chew gum, paint your nails, re-organize the garage, etc      LEAN PROTEIN SOURCES    Protein Source Portion Calories Grams of Protein                           Nonfat, plain Greek yogurt    (10 grams sugar or less) 3/4 cup (6 oz)  12-17   Light Yogurt (10 grams sugar or less) 3/4 cup (6 oz)  6-8   Protein Shake 1 shake 110-180 15-30   Skim/1% Milk or lactose-free milk 1 cup ( 8 oz)  8   Plain or light, flavored soymilk 1 cup  7-8   Plain or light, hemp milk 1 cup 110 6   Fat Free or 1% Cottage Cheese 1/2 cup 90 15   Part skim ricotta cheese 1/2 cup 100 14   Part skim or reduced fat cheese slices 1/4cup, 3 dice 65-80 8     Mozzarella String Cheese 1 80 8   Canned tuna, chicken, crab or salmon  (canned in water)  1/2 cup 100 15-20   White fish (broiled, grilled, baked) 3 ounces 100 21   [place]/Tuna (broiled, grilled, baked) 3 ounces 150-180 21   Shrimp, Scallops, Lobster, Crab 3 ounces 100 21   Pork loin, Pork Tenderloin 3 ounces 150 21   Boneless, skinless chicken /turkey breast                          (broiled, grilled, baked) 3 ounces 120 21   [place], [place], [place], and Venison 3 ounces 120 21   Lean cuts of red meat and pork (sirloin,   round, tenderloin, flank, ground 93%-96%) 3 ounces 170 21   Lean or Extra Lean Ground Turkey 1/2 cup 150 20   90-95% Lean Melba Burger 1 gertrudis 140-180 21   Low-fat casserole with lean meat 3/4 cup 200 17   Luncheon Meats                                                        (turkey, lean ham, roast beef, chicken) 3  ounces 100 21   Egg (boiled, poached, scrambled) 1 Egg 60 7   Egg Substitute 1/2 cup 70 10   Nuts (limit to 1 serving per day)  3 Tbsp. 150 7   Nut McCammon (peanut, almond)  Limit to 1 serving or less daily 1 Tbsp. 90 4   Soy Burger (varies) 1  10-15   Edamame  1/2 cup ~95 9   Garbanzo, Black, Cisneros Beans 1/2 cup 110 7   Refried Beans 1/2 cup 100 7   Kidney and Lima beans 1/2 cup 110 7   Tempeh 3 oz 175 18   Vegan crumbles 1/2 cup 100 14   Tofu 1/2 cup 110 14   Chili (beans and extra lean beef or turkey) 1 cup 200 23   Lentil Stew/Soup 1 cup 150 12   Black Bean Soup 1 cup 175 12     Carbohydrates  Carbohydrates fuel your body with glucose (sugar)--the energy your body needs so you can do your daily activities.  Carbohydrates offer an immediate source of energy for your body. They provide the fuel for your muscles and organs, such as your brain.     Types of Carbohydrates     Complex Carbohydrates are higher in fiber and keep you feeling full longer--helping you eat less.   These are found in nearly all plant-based foods and usually take longer for the body to digest.  They are most commonly found in whole-wheat bread, whole-grain pasta, brown rice, starchy vegetables,   and fruits  Refined Carbohydrates require almost NO WORK for digestion and break down into glucose more quickly   than complex carbohydrates. Refined carbohydrates are usually high in calories and low in nutrients and fiber--  eating more of these can lead to weight gain.  Thinking about eliminating carbohydrates???  If you do not eat enough carbohydrates, the following can occur:  Fatigue  Muscle cramps  Poor mental function  Fatigue easily results from deprivation of carbohydrates, which is seen in people who fast, possibly interfering with activities of daily living.      Thinking about eliminating carbohydrates???  If you do not eat enough carbohydrates, the following can occur:  Fatigue  Muscle cramps  Poor mental function  Fatigue easily  results from deprivation of carbohydrates, which is seen in people who fast, possibly interfering with activities of daily living    Carbohydrates are your body's first choice for fuel. If given a choice of several types of foods simultaneously, your body will use the energy from carbohydrates first.    What foods contain carbohydrates?  Choose the following foods containing carbohydrates (the BEST ones to eat):   Fruit-fresh, frozen, canned in their own juices  Whole grains:  Whole-wheat breads  Brown rice  Oatmeal  Whole-grain cereals  Other starchy foods containing a minimum of 3 grams (g) fiber/100 calories  The ingredient label should list whole wheat or whole grain as one of the first ingredients (bulgur, quinoa, buckwheat, millet, spelt, faro, kasha)  Milk or yogurt (a natural source of carbohydrates):  Low-fat milk  Fat-free milk  Yogurt   Beans or legumes     Starchy vegetables, raw or frozen:  Potatoes  Peas  Corn    AVOID or limit the following foods containing carbohydrates:  Refined sugars, such as in:  Candy  Desserts-ice cream, cakes, pies, brownies, frozen yogurt, sherbet/sorbet  Cookies  White flour: bread/pasta/crackers/rice/tortillas  Sugary snacks: sweetened cereal, granola bars, cereal bars, donuts, muffins, bagels  Sugary Drinks:  Fruit Juice, Smoothies  Sports Drinks  Regular Soda    What are typical serving sizes or portions?  The following are some serving and portion sizes for foods containing carbohydrates:  One medium piece of fruit, about 4?5 ounces (oz) (-tennis ball)  1 cup (C) berries or melon    C canned fruit    C juice (100% vegetable)    C starchy vegetables, cooked or chopped  One slice whole-grain bread  ? C brown rice, quinoa, buckwheat, millet, spelt, faro, kasha    C oatmeal (dry)    C bulgur  One small tortilla (less than 6inch diameter)    C wheat germ  1 oz pretzels     C flaked cereal        Calorie-Controlled Sample Meal Plans    Examples of small healthy  meals    Breakfast   Omelet made with   cup to   cup egg substitute or 2 eggs    cup chopped vegetables  1-2 tbsp. of light cheese     cup salsa  Medium banana    1 cup non-fat plain, Greek yogurt mixed with 1 cup berries and 1-2 Tbsp nuts or cereal   -3/4 cup skim or 1% cottage cheese    cup unsweetened whole-grain cereal  1/2 cup of fresh strawberries  Whole-wheat English muffin or mini bagel, 1 scrambled egg and 1 slice Swiss cheese   Small orange  Protein Bar or Shake (15-30 grams protein and 15-25 grams Carbohydrates)    cup cottage cheese, low-fat    cup fresh fruit    11 ounces of Slim Fast Low Carb (only), Phil's Advantage, EAS Carb Control    Lunch/Dinner  2-3 slices roasted turkey breast  1 tbsp. of fat free mayonnaise  2 slices of  whole-wheat bread, Medium apple  10 baby carrots with 1 tbsp. of low-fat dip     cup water packed tuna or chicken  1 tablespoons of low-fat mayonnaise  1-2 tbsp. dill relish  1 serving of whole-grain crackers  1 cup of strawberries   6 inch turkey sub sandwich with light mayonnaise,   cup cottage cheese                                                                                                                                                      Black bean and low-fat cheese on a whole wheat tortilla with salsa and light sour cream  Grilled chicken sandwich  Tossed salad with light dressing    Baked potato with 3/4 cup of extra lean ground beef, light shredded cheese and salsa  Fresh fruit                                                 Chicken chunks with lettuce and vegetables stuffed in ernestine  Steamed broccoli                                                 3 oz boneless/skinless chicken breast  1/2 cup brown rice with stir-fried vegetables    grapefruit  3 ounces of salmon, trout, or tuna  1 cup of steamed asparagus  1 small slice whole grain Italian bread  Broiled white or pink fish  3/4 cup whole wheat pasta with tomatoes  3/4 cup of roasted red peppers  3 oz. of  extra lean (93/7) hamburger on a [employer] [place] Thins  Tossed salad with light dressing       Black bean or Tuscan bean soup with grated mozzarella cheese    of a flour tortilla    3 ounces of grilled pork loin with 1 tbsp. of low-sugar barbeque sauce, 1 cup of green beans seasoned with pepper  Small dinner roll or   cup of grapefruit sections    1-2 cups of torn manuel    cup of garbanzo beans or diced skinless chicken breast  5-6 cherry tomatoes  1  tbsp. of crumbled feta cheese  1 tbsp. of roasted soy nuts  1 tsp. of olive oil and 2-3 Tbsp. of balsamic or red wine vinegar  Small whole-wheat dinner roll or   cup of cut up pineapple     PROTEIN SOURCES    Animal Protein Sources  Type of Food Portion Grams of Protein Calories   Chicken (light meat, no skin)  3 ounces  27 140   Chicken (canned, white, packed in water)  3 ounces  15 80   Turkey (light meat, no skin)  3 ounces  24 160   Ham (lean)  3 ounces  19 118   Hamburger (lean)  3 ounces  16 150   Pork Tenderloin  3 ounces  22 125   Pork Chop  1 chop (~5 ounces) 39 226   Steak (sirloin, trimmed)  3 ounces  23 207   Game Meat (Venison, [place], [place], [place])  3 ounces  21 - 26 124 - 207   Tuna (packed in water)  1 can, drained (5 ounces) 20 90   Flounder, Leonie  3 ounces  15 81   Halibut, Cod  3 ounces  19 94   [place]   3 ounces  19 175   Shrimp  ~3 ounces (20 shrimp) 22 120   Scallops  ~3 ounces (7 scallops) 19 98   Lobster (steamed)  3 ounces  22 122   Imitation Seafood (ex: crabmeat)  3 ounces  6.5 81   Luncheon Meat (uncured)  4 slices  15 92   Egg (large)  1 egg 6 72   Egg White (large)  1 egg white 4 17   Liquid Egg Substitute  4 tablespoons or   cup  7 32     Dairy Protein Sources  Type of Food Portion Grams of Protein Calories   Protein Shake*  1 shake 15 - 30  90 - 180   Nonfat, Greek yogurt, flavored or plain*   (10 grams sugar or less)    cup/6 ounce container  12 - 20 80 - 140   Cottage Cheese (skim, 1%)*    cup  12 - 14 70 - 90   Ricotta Cheese  (part skim)    cup  4 80   Milk (skim, 1%)  1 cup 8 90 - 110   Mozzarella String Cheese, light  1 stick  7 60   Cheddar Cheese Stick  1 stick  5 80   Shredded Cheese*    cup  6 - 7 80 - 110   Sliced Cheese*  1 slice  4 - 5 70 - 80   *Nutrition information varies by brand       Vegetarian & Vegan Protein Sources   Type of Food Portion Grams of Protein Calories   Plant-based protein shake*  1 shake 100 - 210 17 - 27   Plain or light, flavored soymilk*  1 cup  7 - 8 80 - 129   Plain or light, hemp milk  1 cup  4 101   Black, Kidney, Guerrero, Cisneros, White Beans, Lentils, Split Peas    cup  7 - 9 108 - 125   Chickpeas (Garbanzo Beans)    cup  7 135   Falafel Patties*  Three (85 g)  9 150   Edamame    cup  9  112   Tempeh*    cup  17 160   Tofu, firm*    cup  15 - 23 104 - 192   Veggie Burger*  One, 100-120 g  15 - 20 177 - 270   Seitan*  1 serving, 85 g  21 120   Fabrizio, Flax, or Sunflower Seeds (shelled)    cup  7 204 - 209   Hemp Seeds, hulled    cup  13 224   Pumpkin Seeds    cup  11 207   Sesame Tahini  2 tablespoons 5 107   Almonds    cup  8 204   Brazil, Cashew, Kelly, Pine, Walnuts    cup  5 190 - 227   Pistachios    cup  7 186   Nut Butter (peanut, almond)*  2 tablespoons 6 - 7 190   *Nutrition information varies by brand      High Fiber Foods    1. Pears (3.1 grams)  Pears are both tasty and nutritious and can satisfy a sweet tooth. They are also a good source of fiber.    Fiber content: 5.5 grams in a medium-sized, raw pear, or 3.1 grams per 100 grams.    2. Strawberries (2 grams)  Strawberries are a delicious, healthy option for eating fresh as a summer dessert or as an office snack.    As well as fiber, they also contain vitamin C, manganese, and various antioxidants.    Fiber content: 3 grams in 1 cup of fresh strawberries, or 2 grams per 100 grams    3. Avocado (6.7 grams)  The avocado is high in healthy fats and a good source of fiber.    It also provides vitamin C, potassium, magnesium, vitamin E, and various  B vitamins.    Fiber content: 10 grams in 1 cup of raw avocado, or 6.7 grams per 100 grams    4. Oats (10.1 grams)  Oats are an excellent source of fiber and are high in vitamins, minerals, and antioxidants.    They contain a powerful soluble fiber called beta glucan, which may help manage blood sugar and cholesterol levels.    Fiber content: 16.5 grams per cup of raw oats, or 10.1 grams per 100 grams    5. Apples (2.4 grams)  Apples are a tasty and satisfying fruit. Eaten whole, they also provide both soluble and insoluble fiber.    Fiber content: 4.4 grams in a medium-sized, raw apple, or 2.4 grams per 100 grams    6. Raspberries (6.5 grams)  Raspberries are a nutritious fruit with a distinctive flavor. They contain fiber, vitamin C, and manganese.    Fiber content: One cup of raw raspberries contains 8 grams of fiber, or 6.5 grams per 100 grams    Other high-fiber berries  Here are some other berries you can add to desserts, oatmeal, and smoothies or just snack on during the day:    Blueberries: 2.4 grams per 100-gram serving  Blackberries: 5.3 grams per 100-gram serving    7. Bananas (2.6 grams)  Bananas provide many nutrients, including vitamin C, vitamin B6, and potassium.    A green or unripe banana also contains a significant amount of resistant starch, an indigestible carbohydrate that functions like fiber.    Fiber content: 3.1 grams in a medium-sized banana, or 2.6 grams per 100 grams    8. Carrots (2.8 grams)  The carrot is a root vegetable you can eat raw or cooked.    In addition to fiber, carrots provide vitamin K, vitamin B6, magnesium, and beta carotene, an antioxidant that gets turned into vitamin A in your body.    Fiber content: 3.6 grams in 1 cup of raw carrots, or 2.8 grams per 100 grams    9. Beets (2 grams)  The beet, or beetroot, is a root vegetable that contains valuable nutrients, such as folate, iron, copper, manganese, and potassium.    Beets also provide inorganic nitrates, nutrients  that may have benefits for blood pressure regulation and exercise performance.    Fiber content: 3.8 grams per cup of raw beets, or 2 grams per 100 grams    10. Broccoli (2.6 grams)  Broccoli is a type of cruciferous vegetable and a nutrient-dense food.    It provides fiber and also contains vitamin C, vitamin K, folate, B vitamins, potassium, iron, and manganese. It also contains antioxidants and other nutrients that may help fight cancer. Broccoli is also relatively high in protein, compared with other vegetables.    Fiber content: 2.4 grams per cup, or 2.6 grams per 100 grams    11. Artichoke (5.4 grams)  Artichokes are high in many nutrients and are a good source of fiber.    Fiber content: 6.9 grams in 1 raw globe or [place] artichoke, or 5.4 grams per 100 grams    12. [place] sprouts (3.8 grams)  [place] sprout are cruciferous vegetables related to broccoli.    They contain fiber and are also high in vitamin K, potassium, folate, and potentially cancer-fighting antioxidants.    Fiber content: 3.3 grams per cup of raw [place] sprouts, or 3.8 grams per 100 grams    Try a recipe for [place] sprouts roasted with apples and bustos.    Other high fiber vegetables  Most vegetables contain significant amounts of fiber.    Other notable examples include:    Kale: 4.1 grams  Spinach: 2.2 grams  Tomatoes: 1.2 grams    13. Lentils (10.7 grams)  Lentils are economical, versatile, and highly nutritious. They are a good source of fiber, protein, and many other nutrients.    Fiber content: 13.1 grams per cup of cooked lentils, or 10.7 grams per 100 grams    Try this lentil soup with cumin, coriander, turmeric, and cinnamon.    14. Kidney beans (7.4 grams)  Kidney beans are a popular type of legume. Like other legumes, they provide plant-based protein and various nutrients.    Fiber content: 12.2 grams per cup of cooked beans, or 7.4 per 100 grams    15. Split peas (8.3 grams)  Split peas are made from the dried, split, and  peeled seeds of peas. They re often seen in split pea soup served alongside ham, but can be used in dhals and other recipes.    Fiber content: 16.3 grams per cup of cooked split peas, or 8.3 per 100 grams    16. Chickpeas (7 grams)  The chickpea is another type of legume that s rich in fiber and also provides protein and various minerals    Chickpeas feature in hummus, curries, soups, and many other dishes.    Fiber content: 12.5 grams per cup of cooked chickpeas, or 7.6 per 100 grams    Other high fiber legumes  Most legumes are high in protein, fiber, and various nutrients. Prepared correctly, they offer a tasty and economical source of quality nutrition.    Other high fiber legumes include:    Cooked black beans: 8.7 grams  Cooked edamame: 5.2 grams  Cooked lima beans: 7 grams  Baked beans: 5.5 grams    17. Quinoa (2.8 grams)  Quinoa is a pseudo-cereal that provides fiber and is a useful source of protein for those on a plant-based diet.    It also contains magnesium, iron, zinc, potassium, and antioxidants, to name a few.    Fiber content: 5.2 grams per cup of cooked quinoa, or 2.8 per 100 grams    18. Popcorn (14.5 grams)  Popcorn can be a fun and healthy way to increase fiber.    Air-popped popcorn is very high in fiber, calorie for calorie. However, if you add fat or sugar, the fiber-to-calorie ratio will start to decrease significantly.    Fiber content: 1.15 grams per cup of air-popped popcorn, or 14.5 grams per 100 grams    Other high fiber grains  Nearly all whole grains are high in fiber.    19. Almonds (13.3 grams)  Almonds are high in many nutrients, including healthy fats, vitamin E, manganese, and magnesium.    They can also be made into almond flour for baking.    Fiber content: 4 grams per 3 tablespoons, or 13.3 grams per 100 grams    20. Fabrizio seeds (34.4 grams)  Fabrizio seeds are highly nutritious, tiny black seeds. They are an excellent source of fiber and contain high amounts of magnesium,  phosphorus, and calcium.    Try irene seeds mixed into jam or add them to homemade granola bars.    Fiber content: 9.75 grams per ounce of dried irene seeds, or 34.4 grams per 100 grams    Other high fiber nuts and seeds  Most nuts and seeds contain significant amounts of fiber.    Examples include:    Fresh coconut: 9 grams  Pistachios: 10.6 grams  Walnuts: 6.7 grams  Sunflower seeds: 8.6 grams  Pumpkin seeds: 6 grams  All values are for a 100-gram portion.    21. Sweet potatoes (3 grams)  The sweet potato is a popular tuber that s very filling and has a sweet flavor. It s high in beta carotene, B vitamins, and various minerals.    Sweet potatoes can be a tasty bread substitute or base for nachos.    Fiber content: A medium-sized boiled sweet potato (without skin) has 3.8 g

## 2025-07-16 ENCOUNTER — ANCILLARY PROCEDURE (OUTPATIENT)
Dept: MAMMOGRAPHY | Facility: CLINIC | Age: 50
End: 2025-07-16
Attending: PHYSICIAN ASSISTANT
Payer: COMMERCIAL

## 2025-07-16 ENCOUNTER — HOSPITAL ENCOUNTER (OUTPATIENT)
Dept: CT IMAGING | Facility: HOSPITAL | Age: 50
Discharge: HOME | End: 2025-07-16
Attending: PHYSICIAN ASSISTANT
Payer: COMMERCIAL

## 2025-07-16 DIAGNOSIS — R07.89 LEFT-SIDED CHEST WALL PAIN: ICD-10-CM

## 2025-07-16 PROCEDURE — 71250 CT THORAX DX C-: CPT

## 2025-07-16 PROCEDURE — 77062 BREAST TOMOSYNTHESIS BI: CPT

## 2025-07-16 PROCEDURE — 76642 ULTRASOUND BREAST LIMITED: CPT | Mod: LT

## 2025-08-11 DIAGNOSIS — K75.81 METABOLIC DYSFUNCTION-ASSOCIATED STEATOHEPATITIS (MASH): ICD-10-CM

## 2025-08-11 DIAGNOSIS — E66.9 OBESITY (BMI 30-39.9): ICD-10-CM
